# Patient Record
Sex: FEMALE | Race: ASIAN | NOT HISPANIC OR LATINO | Employment: FULL TIME | URBAN - METROPOLITAN AREA
[De-identification: names, ages, dates, MRNs, and addresses within clinical notes are randomized per-mention and may not be internally consistent; named-entity substitution may affect disease eponyms.]

---

## 2017-07-21 ENCOUNTER — TELEPHONE (OUTPATIENT)
Dept: INTERNAL MEDICINE | Facility: CLINIC | Age: 37
End: 2017-07-21

## 2017-07-21 NOTE — TELEPHONE ENCOUNTER
----- Message from Daysi Roger sent at 7/21/2017 10:33 AM CDT -----  Contact: self/[phone]  Patient called in regards needing to setup a lab orders for her upcoming appointment on 09/22/17. Please call and advise.       Thank you!!!

## 2017-07-21 NOTE — TELEPHONE ENCOUNTER
Patient appt booked incorrectly, conacted staff to reschedule correctly,     Pt requesting labs prior to appt, please advise

## 2017-07-21 NOTE — TELEPHONE ENCOUNTER
----- Message from Daysi Roger sent at 7/21/2017 10:33 AM CDT -----  Contact: self/[phone]  Patient called in regards needing to setup a lab orders for her upcoming appointment on 09/22/17. Please call and advise.       Thank you!!!

## 2017-07-21 NOTE — TELEPHONE ENCOUNTER
"Will order labs but definitely needs to be scheduled in an establish care appt (40 min) slot. Pt is currently in an "established patient" follow up appointment slot (20 min) and pt is new to [facility]. Needs to be in a "new patient" annual/establish care slot.    As she's new to [facility], I'll do fasting labs on the day of the appt. Please let pt know. Thanks!    Will cc staff, Jody and Karmen.  "

## 2017-07-21 NOTE — TELEPHONE ENCOUNTER
Please call patient to reschedule appt, pt is scheduled incorrectly please schedule as a New Patient physical not established patient

## 2017-09-27 ENCOUNTER — OFFICE VISIT (OUTPATIENT)
Dept: INTERNAL MEDICINE | Facility: CLINIC | Age: 37
End: 2017-09-27
Payer: COMMERCIAL

## 2017-09-27 VITALS
HEART RATE: 74 BPM | RESPIRATION RATE: 16 BRPM | DIASTOLIC BLOOD PRESSURE: 58 MMHG | HEIGHT: 64 IN | BODY MASS INDEX: 22.74 KG/M2 | WEIGHT: 133.19 LBS | SYSTOLIC BLOOD PRESSURE: 120 MMHG

## 2017-09-27 DIAGNOSIS — R10.9 RIGHT FLANK PAIN: ICD-10-CM

## 2017-09-27 DIAGNOSIS — M54.50 CHRONIC MIDLINE LOW BACK PAIN WITHOUT SCIATICA: ICD-10-CM

## 2017-09-27 DIAGNOSIS — Z12.4 CERVICAL CANCER SCREENING: ICD-10-CM

## 2017-09-27 DIAGNOSIS — Z00.00 ANNUAL PHYSICAL EXAM: Primary | ICD-10-CM

## 2017-09-27 DIAGNOSIS — G89.29 CHRONIC MIDLINE LOW BACK PAIN WITHOUT SCIATICA: ICD-10-CM

## 2017-09-27 LAB
BACTERIA #/AREA URNS AUTO: NORMAL /HPF
BILIRUB UR QL STRIP: NEGATIVE
CLARITY UR REFRACT.AUTO: ABNORMAL
COLOR UR AUTO: YELLOW
GLUCOSE UR QL STRIP: NEGATIVE
HGB UR QL STRIP: NEGATIVE
KETONES UR QL STRIP: NEGATIVE
LEUKOCYTE ESTERASE UR QL STRIP: ABNORMAL
MICROSCOPIC COMMENT: NORMAL
NITRITE UR QL STRIP: NEGATIVE
PH UR STRIP: 6 [PH] (ref 5–8)
PROT UR QL STRIP: NEGATIVE
RBC #/AREA URNS AUTO: 1 /HPF (ref 0–4)
SP GR UR STRIP: 1.02 (ref 1–1.03)
SQUAMOUS #/AREA URNS AUTO: 3 /HPF
URN SPEC COLLECT METH UR: ABNORMAL
UROBILINOGEN UR STRIP-ACNC: NEGATIVE EU/DL
WBC #/AREA URNS AUTO: 2 /HPF (ref 0–5)

## 2017-09-27 PROCEDURE — 99999 PR PBB SHADOW E&M-EST. PATIENT-LVL IV: CPT | Mod: PBBFAC,,, | Performed by: INTERNAL MEDICINE

## 2017-09-27 PROCEDURE — 81001 URINALYSIS AUTO W/SCOPE: CPT

## 2017-09-27 PROCEDURE — 99385 PREV VISIT NEW AGE 18-39: CPT | Mod: S$GLB,,, | Performed by: INTERNAL MEDICINE

## 2017-09-27 NOTE — PROGRESS NOTES
"INTERNAL MEDICINE INITIAL VISIT NOTE      CHIEF COMPLAINT     Chief Complaint   Patient presents with    [facility]     has occassional right side flank pain     HPI     Compa Beard is a 37 y.o. [place] female who presents to [facility].    C/o R flank pain. Intermittent. Fatty foods make it worse. Daily. No fevers/chills. No diarrhea/constipation. No nausea/vomiting.   Has happened before a few years ago (this pain feels just like a few yrs ago) - told she might be having cholecystitis - had US done in [place]. Took a medicine and improved.     C/o occasionally w/ lower back pain. No pain on urination. No hematuria.     Past Medical History:  History reviewed. No pertinent past medical history.    Past Surgical History:  History reviewed. No pertinent surgical history.    Allergies:  Review of patient's allergies indicates:   Allergen Reactions    Fish containing products Swelling     Facial swelling (lips, eyes, all areas)       Home Medications:  Prior to Admission medications    Not on File       Family History:  Family History   Problem Relation Age of Onset    No Known Problems Mother     Heart disease Father 55     CAD s/p stent    No Known Problems Sister     No Known Problems Brother        Social History:  Social History   Substance Use Topics    Smoking status: Never Smoker    Smokeless tobacco: Never Used    Alcohol use No     Review of Systems:  Review of Systems Comprehensive review of systems otherwise negative. See history/subjective section for more details.    Health Maintainence:   Td - declines  Flu - declines.  Pap - about 3-4 yrs ago.     PHYSICAL EXAM     BP (!) 120/58   Pulse 74   Resp 16   Ht 5' 4" (1.626 m)   Wt 60.4 kg (133 lb 2.5 oz)   LMP 09/10/2017   BMI 22.86 kg/m²     GEN - A+OX4, NAD   HEENT - PERRL, EOMI, OP clear. MMM.   Neck - No thyromegaly or cervical LAD. No thyroid masses felt.  CV - RRR, no m/r   Chest - CTAB, no wheezing or rhonchi  Abd - S/NT/ND/+BS.  " Negative Carson's sign.  No rebound or guarding.  Ext - 2+BDP and radial pulses. No LE edema.   Neuro - PERRL, EOMI, no nystagmus, eyebrow raise, facial sensation, hearing, m of mastication, smile, palatal raise, shoulder shrug, tongue protrusion symmetric and intact. 5/5 BUE and BLE strength. Sensation to light touch intact throughout. 2+ DTRs. Normal gait.   MSK - No spinal tenderness to palpation. Normal gait.   Skin - No rash.    LABS     Previous labs reviewed.    ASSESSMENT/PLAN     Compa Beard is a 37 y.o. female with  Compa was seen today for establish care.    Diagnoses and all orders for this visit:    Annual physical exam  -     CBC auto differential; Future; Expected date: 09/27/2017  -     Comprehensive metabolic panel; Future; Expected date: 09/27/2017  -     Hemoglobin A1c; Future; Expected date: 09/27/2017  -     Lipid panel; Future; Expected date: 09/27/2017  -     TSH; Future; Expected date: 09/27/2017  -     Vitamin D; Future; Expected date: 09/27/2017    Chronic midline low back pain without sciatica  -     Urinalysis Microscopic  -     Urinalysis    Right flank pain  -     US Abdomen Complete; Future; Expected date: 09/27/2017    Cervical cancer screening  -     Ambulatory Referral to Obstetrics / Gynecology      RTC in 12 months, sooner if needed and depending on labs.    Dagmar Betancur MD  Department of Internal Medicine - [facility]  9:07 AM

## 2017-09-29 ENCOUNTER — HOSPITAL ENCOUNTER (OUTPATIENT)
Dept: RADIOLOGY | Facility: HOSPITAL | Age: 37
Discharge: HOME | End: 2017-09-29
Attending: INTERNAL MEDICINE
Payer: COMMERCIAL

## 2017-09-29 DIAGNOSIS — E55.9 VITAMIN D DEFICIENCY: ICD-10-CM

## 2017-09-29 DIAGNOSIS — R10.9 RIGHT FLANK PAIN: ICD-10-CM

## 2017-09-29 PROCEDURE — 76700 US EXAM ABDOM COMPLETE: CPT | Mod: 26,,, | Performed by: RADIOLOGY

## 2017-09-29 PROCEDURE — 76700 US EXAM ABDOM COMPLETE: CPT | Mod: TC

## 2017-10-18 ENCOUNTER — OFFICE VISIT (OUTPATIENT)
Dept: OBSTETRICS AND GYNECOLOGY | Facility: CLINIC | Age: 37
End: 2017-10-18
Payer: COMMERCIAL

## 2017-10-18 VITALS
BODY MASS INDEX: 22.65 KG/M2 | HEIGHT: 64 IN | WEIGHT: 132.69 LBS | DIASTOLIC BLOOD PRESSURE: 58 MMHG | SYSTOLIC BLOOD PRESSURE: 90 MMHG

## 2017-10-18 DIAGNOSIS — Z01.419 ENCOUNTER FOR GYNECOLOGICAL EXAMINATION WITHOUT ABNORMAL FINDING: Primary | ICD-10-CM

## 2017-10-18 PROCEDURE — 99999 PR PBB SHADOW E&M-EST. PATIENT-LVL III: CPT | Mod: PBBFAC,,, | Performed by: OBSTETRICS & GYNECOLOGY

## 2017-10-18 PROCEDURE — 99385 PREV VISIT NEW AGE 18-39: CPT | Mod: S$GLB,,, | Performed by: OBSTETRICS & GYNECOLOGY

## 2017-10-18 NOTE — LETTER
October 18, 2017      Dagmar Betancur MD  [address]  [place] [address]           [facility]  [address]  [place] [address]  Phone: [phone]          Patient: Compa Beard   MR Number: [id number]   YOB: 1980   Date of Visit: 10/18/2017       Dear Dr. Dagmar Betancur:    Thank you for referring Compa Beard to me for evaluation. Attached you will find relevant portions of my assessment and plan of care.    If you have questions, please do not hesitate to call me. I look forward to following Compa Beard along with you.    Sincerely,    Lindsey Rosa MD    Enclosure  CC:  No Recipients    If you would like to receive this communication electronically, please contact [email or web address] or [phone] to request more information on [employer] Link access.    For providers and/or their staff who would like to refer a patient to [facility], please contact us through our one-stop-shop provider referral line, [facility], at [phone].    If you feel you have received this communication in error or would no longer like to receive these types of communications, please e-mail [email or web address]

## 2017-10-18 NOTE — PROGRESS NOTES
"  Subjective:       Patient ID: Compa Beard is a 37 y.o. female.    Chief Complaint:  Annual Exam      History of Present Illness  HPI    Compa Beard is a 37 y.o. female  NEW TO ME here for her annual GYN exam.  She was scheduled by her PCP, but she thought she was getting evaluated for possible Gallbladder disease.  She describes her periods as regular, normal flow, lasting 4 days.   Denies break through bleeding.   Denies vaginal itching or irritation.  Denies vaginal discharge.  She is sexually active. She has had 1 partner for 14 years .  She uses condoms for contraception.   History of abnormal pap: No  Last Pap: patient does not recall when last pap was  Denies domestic violence. She does feel safe at home.     Past Medical History:   Diagnosis Date    Vitamin D deficiency 2017     No past surgical history on file.  Social History     Social History    Marital status:      Spouse name: N/A    Number of children: N/A    Years of education: N/A     Occupational History    sales      Social History Main Topics    Smoking status: Never Smoker    Smokeless tobacco: Never Used    Alcohol use No    Drug use: No    Sexual activity: Yes     Partners: Male     Birth control/ protection: None, Condom      Comment:  since      Other Topics Concern    Not on file     Social History Narrative    Lived in [place] for 7 yrs now.      Family History   Problem Relation Age of Onset    No Known Problems Mother     Heart disease Father 55     CAD s/p stent    No Known Problems Sister     No Known Problems Brother     Breast cancer Neg Hx     Colon cancer Neg Hx     Ovarian cancer Neg Hx      OB History      Para Term  AB Living    1 1 1     1    SAB TAB Ectopic Multiple Live Births            1          BP (!) 90/58   Ht 5' 4" (1.626 m)   Wt 60.2 kg (132 lb 11.5 oz)   LMP 10/10/2017 (Exact Date)   BMI 22.78 kg/m²         GYN & OB History  Patient's last menstrual " period was 10/10/2017 (exact date).   Date of Last Pap: No result found    OB History    Para Term  AB Living   1 1 1     1   SAB TAB Ectopic Multiple Live Births           1      # Outcome Date GA Lbr Darryl/2nd Weight Sex Delivery Anes PTL Lv   1 Term     F    ALVAREZ          Review of Systems  Review of Systems   Constitutional: Negative for activity change, appetite change, fatigue and unexpected weight change.   HENT: Negative.    Eyes: Negative for visual disturbance.   Respiratory: Negative for shortness of breath and wheezing.    Cardiovascular: Negative for chest pain, palpitations and leg swelling.   Gastrointestinal: Negative for abdominal pain, bloating and blood in stool.   Endocrine: Negative for diabetes and hair loss.   Genitourinary: Negative for decreased libido and dyspareunia.   Musculoskeletal: Negative for back pain and joint swelling.   Skin:  Negative for no acne and hair changes.   Neurological: Negative for headaches.   Hematological: Does not bruise/bleed easily.   Psychiatric/Behavioral: Negative for depression and sleep disturbance. The patient is not nervous/anxious.    Breast: Negative for breast pain and nipple discharge          Objective:    Physical Exam:   Constitutional: She is oriented to person, place, and time. She appears well-developed and well-nourished.    HENT:   Head: Normocephalic and atraumatic.    Eyes: EOM are normal. Pupils are equal, round, and reactive to light.    Neck: Normal range of motion. Neck supple.    Cardiovascular: Normal rate and regular rhythm.     Pulmonary/Chest: Effort normal and breath sounds normal.        Abdominal: Soft. Bowel sounds are normal.             Musculoskeletal: Normal range of motion and moves all extremeties.       Neurological: She is alert and oriented to person, place, and time.    Skin: Skin is warm and dry.    Psychiatric: She has a normal mood and affect.          The patient declined to undress for a Breast and  Pelvic exam, stating she did not realize she would have to get them done, and as she is finishing up her menstrual cycle today, She wants to come back another day for the pelvic exam and pap.    Assessment:        1. Encounter for gynecological examination without abnormal finding               Plan:        1. Encounter for gynecological examination without abnormal finding  COUNSELING:  The patient was counseled today on osteoporosis prevention, calcium supplementation, and regular weight bearing exercise. The patient was also counseled today on ACS PAP guidelines, with recommendations for yearly pelvic exams unless their uterus, cervix, and ovaries were removed for benign reasons; in that case, examinations every 3-5 years are recommended. The patient was also counseled regarding monthly breast self-examination, routine STD screening for at-risk populations, prophylactic immunizations for transmitted infections such as HPV, Pertussis, or Influenza as appropriate, and yearly mammograms when indicated by ACS guidelines. She was advised to see her primary care physician for all other health maintenance.   FOLLOW-UP with me for next routine visit.            Return if symptoms worsen or fail to improve.

## 2019-02-20 ENCOUNTER — IMMUNIZATION (OUTPATIENT)
Dept: PHARMACY | Facility: CLINIC | Age: 39
End: 2019-02-20
Payer: COMMERCIAL

## 2019-02-20 ENCOUNTER — HOSPITAL ENCOUNTER (OUTPATIENT)
Dept: RADIOLOGY | Facility: HOSPITAL | Age: 39
Discharge: HOME | End: 2019-02-20
Attending: INTERNAL MEDICINE
Payer: COMMERCIAL

## 2019-02-20 ENCOUNTER — OFFICE VISIT (OUTPATIENT)
Dept: INTERNAL MEDICINE | Facility: CLINIC | Age: 39
End: 2019-02-20
Payer: COMMERCIAL

## 2019-02-20 ENCOUNTER — IMMUNIZATION (OUTPATIENT)
Dept: PHARMACY | Facility: CLINIC | Age: 39
End: 2019-02-20

## 2019-02-20 VITALS
WEIGHT: 130.06 LBS | HEIGHT: 64 IN | HEART RATE: 71 BPM | SYSTOLIC BLOOD PRESSURE: 117 MMHG | BODY MASS INDEX: 22.2 KG/M2 | OXYGEN SATURATION: 97 % | DIASTOLIC BLOOD PRESSURE: 74 MMHG

## 2019-02-20 DIAGNOSIS — Z82.49 FAMILY HISTORY OF HEART DISEASE IN MALE FAMILY MEMBER BEFORE AGE 55: ICD-10-CM

## 2019-02-20 DIAGNOSIS — E55.9 VITAMIN D DEFICIENCY: ICD-10-CM

## 2019-02-20 DIAGNOSIS — R07.89 ATYPICAL CHEST PAIN: ICD-10-CM

## 2019-02-20 DIAGNOSIS — Z00.00 ANNUAL PHYSICAL EXAM: Primary | ICD-10-CM

## 2019-02-20 PROCEDURE — 71046 X-RAY EXAM CHEST 2 VIEWS: CPT | Mod: TC

## 2019-02-20 PROCEDURE — 3008F PR BODY MASS INDEX (BMI) DOCUMENTED: ICD-10-PCS | Mod: CPTII,S$GLB,, | Performed by: INTERNAL MEDICINE

## 2019-02-20 PROCEDURE — 3008F BODY MASS INDEX DOCD: CPT | Mod: CPTII,S$GLB,, | Performed by: INTERNAL MEDICINE

## 2019-02-20 PROCEDURE — 99214 PR OFFICE/OUTPT VISIT, EST, LEVL IV, 30-39 MIN: ICD-10-PCS | Mod: S$GLB,,, | Performed by: INTERNAL MEDICINE

## 2019-02-20 PROCEDURE — 99999 PR PBB SHADOW E&M-EST. PATIENT-LVL IV: CPT | Mod: PBBFAC,,, | Performed by: INTERNAL MEDICINE

## 2019-02-20 PROCEDURE — 99999 PR PBB SHADOW E&M-EST. PATIENT-LVL IV: ICD-10-PCS | Mod: PBBFAC,,, | Performed by: INTERNAL MEDICINE

## 2019-02-20 PROCEDURE — 71046 X-RAY EXAM CHEST 2 VIEWS: CPT | Mod: 26,,, | Performed by: RADIOLOGY

## 2019-02-20 PROCEDURE — 71046 XR CHEST PA AND LATERAL: ICD-10-PCS | Mod: 26,,, | Performed by: RADIOLOGY

## 2019-02-20 PROCEDURE — 99214 OFFICE O/P EST MOD 30 MIN: CPT | Mod: S$GLB,,, | Performed by: INTERNAL MEDICINE

## 2019-02-20 RX ORDER — ASPIRIN 325 MG
50000 TABLET, DELAYED RELEASE (ENTERIC COATED) ORAL WEEKLY
Qty: 12 CAPSULE | Refills: 4 | Status: SHIPPED | OUTPATIENT
Start: 2019-02-20 | End: 2019-07-10 | Stop reason: SDUPTHER

## 2019-02-20 NOTE — PROGRESS NOTES
INTERNAL MEDICINE CLINIC - SAME DAY APPOINTMENT  Progress Note    PRESENTING HISTORY     PCP: Dagmar Betancur MD  Chief Complaint/Reason for Visit:     Chief Complaint   Patient presents with    Follow-up     History of Present Illness & ROS : Ms. Compa Beard is a 38 y.o. female.      Here for annual physical.    Sometimes she feels pain in the left chest lasting only 1 sec.  Chest pain sometimes.  No SOB.    Had echo normal in [place] - without issues.    No heartburn.    PAST HISTORY:     Past Medical History:   Diagnosis Date    Vitamin D deficiency 9/29/2017     History reviewed. No pertinent surgical history.    Family History   Problem Relation Age of Onset    No Known Problems Mother     Heart disease Father 55        CAD s/p stent    No Known Problems Sister     No Known Problems Brother     Breast cancer Neg Hx     Colon cancer Neg Hx     Ovarian cancer Neg Hx        Social History     Socioeconomic History    Marital status:      Spouse name: None    Number of children: 1    Years of education: None    Highest education level: None   Social Needs    Financial resource strain: None    Food insecurity - worry: None    Food insecurity - inability: None    Transportation needs - medical: None    Transportation needs - non-medical: None   Occupational History    Occupation: sales   Tobacco Use    Smoking status: Never Smoker    Smokeless tobacco: Never Used   Substance and Sexual Activity    Alcohol use: No    Drug use: No    Sexual activity: Yes     Partners: Male     Birth control/protection: None, Condom     Comment:  since 2003   Other Topics Concern    None   Social History Narrative    Lived in [place] for 7 yrs now.        MEDICATIONS & ALLERGIES:     No current outpatient medications on file prior to visit.     No current facility-administered medications on file prior to visit.         Review of patient's allergies indicates:   Allergen Reactions    Fish containing  products Swelling     Facial swelling (lips, eyes, all areas)       Medications Reconciliation:   I have reconciled the patient's home medications with the patient/family. I have updated all changes.  Refer to After-Visit Medication List.    OBJECTIVE:     Vital Signs:  Vitals:    02/20/19 0937   BP: 117/74   Pulse: 71     Wt Readings from Last 1 Encounters:   02/20/19 0937 59 kg (130 lb 1.1 oz)     Body mass index is 22.33 kg/m².     Physical Exam:  General: Well developed, well nourished. No distress.  HEENT: Head is normocephalic, atraumatic; ears are normal.    Eyes: Clear conjunctiva.  Neck: Supple, symmetrical neck; trachea midline.  Lungs: Clear to auscultation bilaterally and normal respiratory effort.  Cardiovascular: Heart with regular rate and rhythm.    Extremities: No LE edema. Pulses 2+ and symmetric.   Abdomen: Abdomen is soft, non-tender non-distended with normal bowel sounds.  Skin: Skin color, texture, turgor normal. No rashes.  Musculoskeletal: Normal gait.   Lymph Nodes: No cervical or supraclavicular adenopathy.  Neurologic: Normal strength and tone. No focal numbness or weakness.   Psychiatric: Normal affect. Alert.    Laboratory  Lab Results   Component Value Date    WBC 6.99 09/29/2017    HGB 12.2 09/29/2017    HCT 37.2 09/29/2017     09/29/2017    CHOL 147 09/29/2017    TRIG 88 09/29/2017    HDL 47 09/29/2017    ALT 9 (L) 09/29/2017    AST 14 09/29/2017     09/29/2017    K 4.3 09/29/2017     09/29/2017    CREATININE 0.6 09/29/2017    BUN 7 09/29/2017    CO2 21 (L) 09/29/2017    TSH 1.563 09/29/2017    HGBA1C 5.3 09/29/2017       ASSESSMENT & PLAN:     Annual physical exam  - PCP is Dr. Betancur.  -     Ambulatory consult to Gynecology  -     Lipid panel; Future; Expected date: 02/20/2019  -     CBC auto differential; Future; Expected date: 02/20/2019  -     Comprehensive metabolic panel; Future; Expected date: 02/20/2019  -     Hemoglobin A1c; Future; Expected date:  02/20/2019  -     TSH; Future; Expected date: 02/20/2019    Vitamin D deficiency  -     cholecalciferol, vitamin D3, 50,000 unit capsule; Take 1 capsule (50,000 Units total) by mouth once a week.  Dispense: 12 capsule; Refill: 4    Family history of heart disease in male family member before age 55  Atypical chest pain  -     X-Ray Chest PA And Lateral; Future; Expected date: 02/20/2019  -     Echocardiogram stress test with CFD (Cupid Only); Future; Expected date: 03/20/2019      Scheduled Follow-up :  Future Appointments   Date Time Provider Department Center   2/22/2019  8:30 AM [facility]i [facility]       After Visit Medication List :     Medication List           Accurate as of 2/20/19 10:20 AM. If you have any questions, ask your nurse or doctor.               START taking these medications    cholecalciferol (vitamin D3) 50,000 unit capsule  Take 1 capsule (50,000 Units total) by mouth once a week.  Started by:  Guzman Salcedo MD           Where to Get Your Medications      These medications were sent to [facility] [address] [place] [address]  [address] [place] [id number]    Phone:  [phone]   · cholecalciferol (vitamin D3) 50,000 unit capsule         Signing Physician:  Guzman Salcedo MD

## 2019-02-22 ENCOUNTER — LAB VISIT (OUTPATIENT)
Dept: LAB | Facility: HOSPITAL | Age: 39
End: 2019-02-22
Attending: INTERNAL MEDICINE
Payer: COMMERCIAL

## 2019-02-22 DIAGNOSIS — Z00.00 ANNUAL PHYSICAL EXAM: ICD-10-CM

## 2019-02-22 LAB
ALBUMIN SERPL BCP-MCNC: 3.8 G/DL
ALP SERPL-CCNC: 51 U/L
ALT SERPL W/O P-5'-P-CCNC: 11 U/L
ANION GAP SERPL CALC-SCNC: 7 MMOL/L
AST SERPL-CCNC: 13 U/L
BASOPHILS # BLD AUTO: 0.02 K/UL
BASOPHILS NFR BLD: 0.4 %
BILIRUB SERPL-MCNC: 0.5 MG/DL
BUN SERPL-MCNC: 13 MG/DL
CALCIUM SERPL-MCNC: 9.2 MG/DL
CHLORIDE SERPL-SCNC: 108 MMOL/L
CHOLEST SERPL-MCNC: 154 MG/DL
CHOLEST/HDLC SERPL: 3.1 {RATIO}
CO2 SERPL-SCNC: 24 MMOL/L
CREAT SERPL-MCNC: 0.6 MG/DL
DIFFERENTIAL METHOD: ABNORMAL
EOSINOPHIL # BLD AUTO: 0.2 K/UL
EOSINOPHIL NFR BLD: 4.2 %
ERYTHROCYTE [DISTWIDTH] IN BLOOD BY AUTOMATED COUNT: 12.7 %
EST. GFR  (AFRICAN AMERICAN): >60 ML/MIN/1.73 M^2
EST. GFR  (NON AFRICAN AMERICAN): >60 ML/MIN/1.73 M^2
ESTIMATED AVG GLUCOSE: 108 MG/DL
GLUCOSE SERPL-MCNC: 94 MG/DL
HBA1C MFR BLD HPLC: 5.4 %
HCT VFR BLD AUTO: 39 %
HDLC SERPL-MCNC: 49 MG/DL
HDLC SERPL: 31.8 %
HGB BLD-MCNC: 12.2 G/DL
LDLC SERPL CALC-MCNC: 87.8 MG/DL
LYMPHOCYTES # BLD AUTO: 1.7 K/UL
LYMPHOCYTES NFR BLD: 32.8 %
MCH RBC QN AUTO: 29.5 PG
MCHC RBC AUTO-ENTMCNC: 31.3 G/DL
MCV RBC AUTO: 94 FL
MONOCYTES # BLD AUTO: 0.5 K/UL
MONOCYTES NFR BLD: 9.1 %
NEUTROPHILS # BLD AUTO: 2.7 K/UL
NEUTROPHILS NFR BLD: 53.5 %
NONHDLC SERPL-MCNC: 105 MG/DL
PLATELET # BLD AUTO: 235 K/UL
PMV BLD AUTO: 10.7 FL
POTASSIUM SERPL-SCNC: 4.2 MMOL/L
PROT SERPL-MCNC: 7.2 G/DL
RBC # BLD AUTO: 4.13 M/UL
SODIUM SERPL-SCNC: 139 MMOL/L
TRIGL SERPL-MCNC: 86 MG/DL
TSH SERPL DL<=0.005 MIU/L-ACNC: 1.53 UIU/ML
WBC # BLD AUTO: 5.06 K/UL

## 2019-02-22 PROCEDURE — 80053 COMPREHEN METABOLIC PANEL: CPT

## 2019-02-22 PROCEDURE — 84443 ASSAY THYROID STIM HORMONE: CPT

## 2019-02-22 PROCEDURE — 36415 COLL VENOUS BLD VENIPUNCTURE: CPT

## 2019-02-22 PROCEDURE — 80061 LIPID PANEL: CPT

## 2019-02-22 PROCEDURE — 83036 HEMOGLOBIN GLYCOSYLATED A1C: CPT

## 2019-02-22 PROCEDURE — 85025 COMPLETE CBC W/AUTO DIFF WBC: CPT

## 2019-02-27 ENCOUNTER — HOSPITAL ENCOUNTER (OUTPATIENT)
Dept: CARDIOLOGY | Facility: CLINIC | Age: 39
Discharge: HOME | End: 2019-02-27
Attending: INTERNAL MEDICINE
Payer: COMMERCIAL

## 2019-02-27 VITALS — WEIGHT: 130 LBS | HEIGHT: 64 IN | BODY MASS INDEX: 22.2 KG/M2

## 2019-02-27 DIAGNOSIS — R07.89 ATYPICAL CHEST PAIN: ICD-10-CM

## 2019-02-27 LAB
ASCENDING AORTA: 2.35 CM
AV INDEX (PROSTH): 1.03
AV MEAN GRADIENT: 2.27 MMHG
AV PEAK GRADIENT: 4.16 MMHG
AV VALVE AREA: 3.19 CM2
AV VELOCITY RATIO: 0.99
BSA FOR ECHO PROCEDURE: 1.63 M2
CV ECHO LV RWT: 0.32 CM
CV STRESS BASE HR: 64 BPM
DIASTOLIC BLOOD PRESSURE: 59 MMHG
DOP CALC AO PEAK VEL: 1.02 M/S
DOP CALC AO VTI: 23 CM
DOP CALC LVOT AREA: 3.11 CM2
DOP CALC LVOT DIAMETER: 1.99 CM
DOP CALC LVOT PEAK VEL: 1.01 M/S
DOP CALC LVOT STROKE VOLUME: 73.3 CM3
DOP CALCLVOT PEAK VEL VTI: 23.58 CM
E WAVE DECELERATION TIME: 172.53 MSEC
E/A RATIO: 2.1
E/E' RATIO: 8.08
ECHO LV POSTERIOR WALL: 0.65 CM (ref 0.6–1.1)
FRACTIONAL SHORTENING: 33 % (ref 28–44)
INTERVENTRICULAR SEPTUM: 0.61 CM (ref 0.6–1.1)
IVRT: 0.08 MSEC
LA MAJOR: 4.86 CM
LA MINOR: 4.95 CM
LA WIDTH: 3.23 CM
LEFT ATRIUM SIZE: 2.62 CM
LEFT ATRIUM VOLUME INDEX: 21.7 ML/M2
LEFT ATRIUM VOLUME: 35.28 CM3
LEFT INTERNAL DIMENSION IN SYSTOLE: 2.74 CM (ref 2.1–4)
LEFT VENTRICLE DIASTOLIC VOLUME INDEX: 45.33 ML/M2
LEFT VENTRICLE DIASTOLIC VOLUME: 73.84 ML
LEFT VENTRICLE MASS INDEX: 43.6 G/M2
LEFT VENTRICLE SYSTOLIC VOLUME INDEX: 17.2 ML/M2
LEFT VENTRICLE SYSTOLIC VOLUME: 28.04 ML
LEFT VENTRICULAR INTERNAL DIMENSION IN DIASTOLE: 4.09 CM (ref 3.5–6)
LEFT VENTRICULAR MASS: 71.08 G
LV LATERAL E/E' RATIO: 7
LV SEPTAL E/E' RATIO: 9.55
MV PEAK A VEL: 0.5 M/S
MV PEAK E VEL: 1.05 M/S
OHS CV CPX 1 MINUTE RECOVERY HEART RATE: 136 BPM
OHS CV CPX 85 PERCENT MAX PREDICTED HEART RATE MALE: 146
OHS CV CPX ESTIMATED METS: 13
OHS CV CPX MAX PREDICTED HEART RATE: 172
OHS CV CPX PATIENT IS FEMALE: 1
OHS CV CPX PATIENT IS MALE: 0
OHS CV CPX PEAK DIASTOLIC BLOOD PRESSURE: 85 MMHG
OHS CV CPX PEAK HEAR RATE: 173 BPM
OHS CV CPX PEAK RATE PRESSURE PRODUCT: NORMAL
OHS CV CPX PEAK SYSTOLIC BLOOD PRESSURE: 143 MMHG
OHS CV CPX PERCENT MAX PREDICTED HEART RATE ACHIEVED: 101
OHS CV CPX PERCENT TARGET HEART RATE ACHIEVED: 118.49
OHS CV CPX RATE PRESSURE PRODUCT PRESENTING: 6656
OHS CV CPX TARGET HEART RATE: 146
PISA TR MAX VEL: 1.91 M/S
PULM VEIN S/D RATIO: 0.82
PV PEAK D VEL: 0.62 M/S
PV PEAK S VEL: 0.51 M/S
RA MAJOR: 4.43 CM
RA PRESSURE: 3 MMHG
RA WIDTH: 3.03 CM
RIGHT VENTRICULAR END-DIASTOLIC DIMENSION: 2.85 CM
RV TISSUE DOPPLER FREE WALL SYSTOLIC VELOCITY 1 (APICAL 4 CHAMBER VIEW): 11.37 M/S
SINUS: 3.11 CM
STJ: 2.48 CM
STRESS ECHO POST EXERCISE DUR MIN: 7 MIN
STRESS ECHO POST EXERCISE DUR SEC: 52
SYSTOLIC BLOOD PRESSURE: 104 MMHG
TDI LATERAL: 0.15
TDI SEPTAL: 0.11
TDI: 0.13
TR MAX PG: 14.59 MMHG
TRICUSPID ANNULAR PLANE SYSTOLIC EXCURSION: 2.3 CM
TV REST PULMONARY ARTERY PRESSURE: 18 MMHG

## 2019-02-27 PROCEDURE — 93351 ECHOCARDIOGRAM STRESS TEST WITH COLOR FLOW DOPPLER (CUPID ONLY): ICD-10-PCS | Mod: S$GLB,,, | Performed by: INTERNAL MEDICINE

## 2019-02-27 PROCEDURE — 93351 STRESS TTE COMPLETE: CPT | Mod: S$GLB,,, | Performed by: INTERNAL MEDICINE

## 2019-03-27 ENCOUNTER — OFFICE VISIT (OUTPATIENT)
Dept: OBSTETRICS AND GYNECOLOGY | Facility: CLINIC | Age: 39
End: 2019-03-27
Payer: COMMERCIAL

## 2019-03-27 VITALS
HEIGHT: 66 IN | SYSTOLIC BLOOD PRESSURE: 118 MMHG | BODY MASS INDEX: 21.21 KG/M2 | WEIGHT: 132 LBS | DIASTOLIC BLOOD PRESSURE: 60 MMHG

## 2019-03-27 DIAGNOSIS — Z01.419 ENCOUNTER FOR GYNECOLOGICAL EXAMINATION WITHOUT ABNORMAL FINDING: Primary | ICD-10-CM

## 2019-03-27 DIAGNOSIS — Z12.4 PAP SMEAR FOR CERVICAL CANCER SCREENING: ICD-10-CM

## 2019-03-27 DIAGNOSIS — Z12.31 SCREENING MAMMOGRAM, ENCOUNTER FOR: ICD-10-CM

## 2019-03-27 PROCEDURE — 87624 HPV HI-RISK TYP POOLED RSLT: CPT

## 2019-03-27 PROCEDURE — 99999 PR PBB SHADOW E&M-EST. PATIENT-LVL III: ICD-10-PCS | Mod: PBBFAC,,, | Performed by: OBSTETRICS & GYNECOLOGY

## 2019-03-27 PROCEDURE — 99395 PREV VISIT EST AGE 18-39: CPT | Mod: S$GLB,,, | Performed by: OBSTETRICS & GYNECOLOGY

## 2019-03-27 PROCEDURE — 88175 CYTOPATH C/V AUTO FLUID REDO: CPT

## 2019-03-27 PROCEDURE — 99999 PR PBB SHADOW E&M-EST. PATIENT-LVL III: CPT | Mod: PBBFAC,,, | Performed by: OBSTETRICS & GYNECOLOGY

## 2019-03-27 PROCEDURE — 99395 PR PREVENTIVE VISIT,EST,18-39: ICD-10-PCS | Mod: S$GLB,,, | Performed by: OBSTETRICS & GYNECOLOGY

## 2019-03-27 NOTE — LETTER
March 27, 2019      Guzman Salcedo MD  [address]  [place] [address]           [facility]  [address] [facility]  [facility] [address]  Phone: [phone]          Patient: Compa Beard   MR Number: [id number]   YOB: 1980   Date of Visit: 3/27/2019       Dear Dr. Guzman Salcedo:    Thank you for referring Compa Beard to me for evaluation. Attached you will find relevant portions of my assessment and plan of care.    If you have questions, please do not hesitate to call me. I look forward to following Compa Beard along with you.    Sincerely,    Lindsey Rosa MD    Enclosure  CC:  No Recipients    If you would like to receive this communication electronically, please contact [email or web address] or [phone] to request more information on [employer] access.    For providers and/or their staff who would like to refer a patient to [facility], please contact us through our one-stop-shop provider referral line, [facility] , at [phone].    If you feel you have received this communication in error or would no longer like to receive these types of communications, please e-mail [email or web address]

## 2019-03-27 NOTE — PROGRESS NOTES
Subjective:       Patient ID: Compa Beard is a 38 y.o. female.    Chief Complaint:  Well Woman      History of Present Illness  HPI    Compa Beard is a 38 y.o. female [id number] TO ME  here for her annual GYN exam.    She describes her periods as regular, normal flow, lasting 3-4 days.   denies break through bleeding.   denies vaginal itching or irritation.  Denies vaginal discharge.  She is sexually active. She has hd 1 partner for 16 years .  She uses condoms for contraception.   History of abnormal pap: No  Last Pap: patient does not recall when last pap was  Last MMG: None  Last Colonoscopy:  None  denies domestic violence. She does feel safe at home.     Past Medical History:   Diagnosis Date    Vitamin D deficiency 2017     History reviewed. No pertinent surgical history.  Social History     Socioeconomic History    Marital status:      Spouse name: Not on file    Number of children: 1    Years of education: Not on file    Highest education level: Not on file   Occupational History    Occupation: sales   Social Needs    Financial resource strain: Not on file    Food insecurity:     Worry: Not on file     Inability: Not on file    Transportation needs:     Medical: Not on file     Non-medical: Not on file   Tobacco Use    Smoking status: Never Smoker    Smokeless tobacco: Never Used   Substance and Sexual Activity    Alcohol use: No    Drug use: No    Sexual activity: Yes     Partners: Male     Birth control/protection: None, Condom     Comment:  since    Lifestyle    Physical activity:     Days per week: Not on file     Minutes per session: Not on file    Stress: Not on file   Relationships    Social connections:     Talks on phone: Not on file     Gets together: Not on file     Attends [employer] service: Not on file     Active member of club or organization: Not on file     Attends meetings of clubs or organizations: Not on file     Relationship status: Not on file     "Intimate partner violence:     Fear of current or ex partner: Not on file     Emotionally abused: Not on file     Physically abused: Not on file     Forced sexual activity: Not on file   Other Topics Concern    Not on file   Social History Narrative    Lived in [place] for 7 yrs now.      Family History   Problem Relation Age of Onset    No Known Problems Mother     Heart disease Father 55        CAD s/p stent    No Known Problems Sister     No Known Problems Brother     Breast cancer Neg Hx     Colon cancer Neg Hx     Ovarian cancer Neg Hx     Diabetes Neg Hx     Hypertension Neg Hx      OB History        1    Para   1    Term   1            AB        Living   1       SAB        TAB        Ectopic        Multiple        Live Births   1                 /60   Ht 5' 6" (1.676 m)   Wt 59.9 kg (132 lb)   LMP 2019   BMI 21.31 kg/m²         GYN & OB History  Patient's last menstrual period was 2019.   Date of Last Pap: No result found    OB History    Para Term  AB Living   1 1 1     1   SAB TAB Ectopic Multiple Live Births           1      # Outcome Date GA Lbr Darryl/2nd Weight Sex Delivery Anes PTL Lv   1 Term 04    F Vag-Spont   ALVAREZ       Review of Systems  Review of Systems   Constitutional: Negative for activity change, appetite change, fatigue and unexpected weight change.   HENT: Negative.    Eyes: Negative for visual disturbance.   Respiratory: Negative for shortness of breath and wheezing.    Cardiovascular: Negative for chest pain, palpitations and leg swelling.   Gastrointestinal: Negative for abdominal pain, bloating and blood in stool.   Endocrine: Negative for diabetes and hair loss.   Genitourinary: Negative for decreased libido, dyspareunia, menorrhagia and menstrual problem.   Musculoskeletal: Negative for back pain and joint swelling.   Integumentary:  Negative for acne, hair changes and nipple discharge.   Neurological: Negative for " headaches.   Hematological: Does not bruise/bleed easily.   Psychiatric/Behavioral: Negative for depression and sleep disturbance. The patient is not nervous/anxious.    Breast: Negative for mastodynia and nipple discharge          Objective:      Physical Exam:   Constitutional: She is oriented to person, place, and time. She appears well-developed and well-nourished.    HENT:   Head: Normocephalic and atraumatic.    Eyes: Pupils are equal, round, and reactive to light. EOM are normal.    Neck: Normal range of motion. Neck supple.    Cardiovascular: Normal rate and regular rhythm.     Pulmonary/Chest: Effort normal and breath sounds normal.   BREASTS:  no mass, no tenderness, no deformity and no retraction. Right breast exhibits no inverted nipple, no mass, no nipple discharge, no skin change, no tenderness, no bleeding and no swelling. Left breast exhibits no inverted nipple, no mass, no nipple discharge, no skin change, no tenderness, no bleeding and no swelling. Breasts are symmetrical.              Abdominal: Soft. Bowel sounds are normal.     Genitourinary: Pelvic exam was performed with patient supine.   Genitourinary Comments: PELVIC: Normal external genitalia without lesions.  Normal hair distribution.  Adequate perineal body, normal urethral meatus.  Vagina moist and well rugated without lesions or discharge.  Cervix pink, without lesions, discharge or tenderness.  No significant cystocele or rectocele.  Bimanual exam shows uterus to be normal size, regular, mobile and nontender.  Adnexa without masses or tenderness.               Musculoskeletal: Normal range of motion and moves all extremeties.       Neurological: She is alert and oriented to person, place, and time.    Skin: Skin is warm and dry.    Psychiatric: She has a normal mood and affect.              Assessment:        1. Encounter for gynecological examination without abnormal finding    2. Pap smear for cervical cancer screening    3.  Screening mammogram, encounter for                Plan:        1. Encounter for gynecological examination without abnormal finding  COUNSELING:  The patient was counseled today on osteoporosis prevention, calcium supplementation, and regular weight bearing exercise. The patient was also counseled today on ACS PAP guidelines, with recommendations for yearly pelvic exams unless their uterus, cervix, and ovaries were removed for benign reasons; in that case, examinations every 3-5 years are recommended. The patient was also counseled regarding monthly breast self-examination, routine STD screening for at-risk populations, prophylactic immunizations for transmitted infections such as HPV, Pertussis, or Influenza as appropriate, and yearly mammograms when indicated by ACS guidelines. She was advised to see her primary care physician for all other health maintenance.   FOLLOW-UP with me for next routine visit.         2. Pap smear for cervical cancer screening      - Liquid-based pap smear, screening  - HPV High Risk Genotypes, PCR    3. Screening mammogram, encounter for      - Mammo Digital Screening Bilat w/ Raulito; Future       Follow up in about 1 year (around 3/27/2020).

## 2019-04-02 LAB
HPV HR 12 DNA CVX QL NAA+PROBE: NEGATIVE
HPV16 AG SPEC QL: NEGATIVE
HPV18 DNA SPEC QL NAA+PROBE: NEGATIVE

## 2019-07-03 ENCOUNTER — APPOINTMENT (OUTPATIENT)
Dept: RADIOLOGY | Facility: OTHER | Age: 39
End: 2019-07-03
Attending: OBSTETRICS & GYNECOLOGY
Payer: COMMERCIAL

## 2019-07-03 VITALS — WEIGHT: 132 LBS | HEIGHT: 66 IN | BODY MASS INDEX: 21.21 KG/M2

## 2019-07-03 DIAGNOSIS — Z12.31 SCREENING MAMMOGRAM, ENCOUNTER FOR: ICD-10-CM

## 2019-07-03 PROCEDURE — 77067 SCR MAMMO BI INCL CAD: CPT | Mod: 26,,, | Performed by: RADIOLOGY

## 2019-07-03 PROCEDURE — 77067 MAMMO DIGITAL SCREENING BILAT WITH TOMOSYNTHESIS_CAD: ICD-10-PCS | Mod: 26,,, | Performed by: RADIOLOGY

## 2019-07-03 PROCEDURE — 77067 SCR MAMMO BI INCL CAD: CPT | Mod: TC,PN

## 2019-07-03 PROCEDURE — 77063 MAMMO DIGITAL SCREENING BILAT WITH TOMOSYNTHESIS_CAD: ICD-10-PCS | Mod: 26,,, | Performed by: RADIOLOGY

## 2019-07-03 PROCEDURE — 77063 BREAST TOMOSYNTHESIS BI: CPT | Mod: 26,,, | Performed by: RADIOLOGY

## 2019-07-10 ENCOUNTER — OFFICE VISIT (OUTPATIENT)
Dept: INTERNAL MEDICINE | Facility: CLINIC | Age: 39
End: 2019-07-10
Payer: COMMERCIAL

## 2019-07-10 VITALS
BODY MASS INDEX: 22.44 KG/M2 | HEART RATE: 65 BPM | OXYGEN SATURATION: 98 % | HEIGHT: 65 IN | DIASTOLIC BLOOD PRESSURE: 64 MMHG | WEIGHT: 134.69 LBS | SYSTOLIC BLOOD PRESSURE: 100 MMHG

## 2019-07-10 DIAGNOSIS — L56.8 PHOTOSENSITIVITY OF SKIN: Primary | ICD-10-CM

## 2019-07-10 DIAGNOSIS — L20.81 ATOPIC NEURODERMATITIS: ICD-10-CM

## 2019-07-10 DIAGNOSIS — E55.9 VITAMIN D DEFICIENCY: ICD-10-CM

## 2019-07-10 PROBLEM — R07.89 ATYPICAL CHEST PAIN: Status: RESOLVED | Noted: 2019-02-20 | Resolved: 2019-07-10

## 2019-07-10 PROCEDURE — 99214 OFFICE O/P EST MOD 30 MIN: CPT | Mod: S$GLB,,, | Performed by: INTERNAL MEDICINE

## 2019-07-10 PROCEDURE — 3008F PR BODY MASS INDEX (BMI) DOCUMENTED: ICD-10-PCS | Mod: CPTII,S$GLB,, | Performed by: INTERNAL MEDICINE

## 2019-07-10 PROCEDURE — 99999 PR PBB SHADOW E&M-EST. PATIENT-LVL III: CPT | Mod: PBBFAC,,, | Performed by: INTERNAL MEDICINE

## 2019-07-10 PROCEDURE — 3008F BODY MASS INDEX DOCD: CPT | Mod: CPTII,S$GLB,, | Performed by: INTERNAL MEDICINE

## 2019-07-10 PROCEDURE — 99214 PR OFFICE/OUTPT VISIT, EST, LEVL IV, 30-39 MIN: ICD-10-PCS | Mod: S$GLB,,, | Performed by: INTERNAL MEDICINE

## 2019-07-10 PROCEDURE — 99999 PR PBB SHADOW E&M-EST. PATIENT-LVL III: ICD-10-PCS | Mod: PBBFAC,,, | Performed by: INTERNAL MEDICINE

## 2019-07-10 RX ORDER — ASPIRIN 325 MG
50000 TABLET, DELAYED RELEASE (ENTERIC COATED) ORAL
Qty: 24 CAPSULE | Refills: 4 | Status: SHIPPED | OUTPATIENT
Start: 2019-07-11 | End: 2020-03-13 | Stop reason: SDUPTHER

## 2019-07-10 RX ORDER — TRIAMCINOLONE ACETONIDE 5 MG/G
CREAM TOPICAL 2 TIMES DAILY PRN
Qty: 15 G | Refills: 6 | Status: SHIPPED | OUTPATIENT
Start: 2019-07-10 | End: 2020-03-13 | Stop reason: SDUPTHER

## 2019-07-10 NOTE — PROGRESS NOTES
INTERNAL MEDICINE CLINIC - SAME DAY APPOINTMENT  Progress Note    PRESENTING HISTORY     PCP: Dagmar Betancur MD  Chief Complaint/Reason for Visit:     Chief Complaint   Patient presents with    Follow-up     lab results     History of Present Illness & ROS : Ms. Compa Beard is a 39 y.o. female.      She complains of itching to neck area post sunlight.  Also rash to right antecubital area.      PAST HISTORY:     Past Medical History:   Diagnosis Date    Vitamin D deficiency 9/29/2017       No past surgical history on file.    Family History   Problem Relation Age of Onset    No Known Problems Mother     Heart disease Father 55        CAD s/p stent    No Known Problems Sister     No Known Problems Brother     Breast cancer Neg Hx     Colon cancer Neg Hx     Ovarian cancer Neg Hx     Diabetes Neg Hx     Hypertension Neg Hx        Social History     Socioeconomic History    Marital status:      Spouse name: Not on file    Number of children: 1    Years of education: Not on file    Highest education level: Not on file   Occupational History    Occupation: sales   Social Needs    Financial resource strain: Not on file    Food insecurity:     Worry: Not on file     Inability: Not on file    Transportation needs:     Medical: Not on file     Non-medical: Not on file   Tobacco Use    Smoking status: Never Smoker    Smokeless tobacco: Never Used   Substance and Sexual Activity    Alcohol use: No    Drug use: No    Sexual activity: Yes     Partners: Male     Birth control/protection: None, Condom     Comment:  since 2003   Lifestyle    Physical activity:     Days per week: Not on file     Minutes per session: Not on file    Stress: Not on file   Relationships    Social connections:     Talks on phone: Not on file     Gets together: Not on file     Attends [employer] service: Not on file     Active member of club or organization: Not on file     Attends meetings of clubs or organizations: Not on  file     Relationship status: Not on file   Other Topics Concern    Not on file   Social History Narrative    Lived in [place] for 7 yrs now.        MEDICATIONS & ALLERGIES:     Current Outpatient Medications on File Prior to Visit   Medication Sig Dispense Refill     cholecalciferol, vitamin D3, 50,000 unit capsule Take 1 capsule (50,000 Units total) by mouth once a week. 12 capsule 4     No current facility-administered medications on file prior to visit.         Review of patient's allergies indicates:   Allergen Reactions    Fish containing products Swelling     Facial swelling (lips, eyes, all areas)       Medications Reconciliation:   I have reconciled the patient's home medications with the patient/family. I have updated all changes.  Refer to After-Visit Medication List.    OBJECTIVE:     Vital Signs:  Vitals:    07/10/19 1520   BP: 100/64   Pulse: 65     Wt Readings from Last 1 Encounters:   07/10/19 1520 61.1 kg (134 lb 11.2 oz)     Body mass index is 22.42 kg/m².     Physical Exam:  General: Well developed, well nourished.  HEENT: Head is normocephalic, atraumatic   Eyes: Clear conjunctiva.  Neck: Supple, symmetrical neck; trachea midline. No rash found.  Lungs:  normal respiratory effort.  Cardiovascular: Heart with regular rate and rhythm.    Extremities: No LE edema.    Skin: slight rash on right antecubital.  Neck without rash.   Musculoskeletal: Normal gait.   Psychiatric: Normal affect. Alert.    Laboratory  Lab Results   Component Value Date    WBC 5.06 02/22/2019    HGB 12.2 02/22/2019    HCT 39.0 02/22/2019     02/22/2019    CHOL 154 02/22/2019    TRIG 86 02/22/2019    HDL 49 02/22/2019    ALT 11 02/22/2019    AST 13 02/22/2019     02/22/2019    K 4.2 02/22/2019     02/22/2019    CREATININE 0.6 02/22/2019    BUN 13 02/22/2019    CO2 24 02/22/2019    TSH 1.535 02/22/2019    HGBA1C 5.4 02/22/2019       ASSESSMENT & PLAN:     Photosensitivity of skin  Atopic  neurodermatitis  -     triamcinolone acetonide 0.5% (KENALOG) 0.5 % Crea; Apply topically 2 (two) times daily as needed.  Dispense: 15 g; Refill: 6    Vitamin D deficiency  - Discussed importance of taking Vitamin D due to long term.     RTC to check in 6 month.  -     cholecalciferol, vitamin D3, 50,000 unit capsule; Take 1 capsule (50,000 Units total) by mouth every Monday and Thursday.  Dispense: 24 capsule; Refill: 4      After Visit Medication List :     Medication List           Accurate as of 7/10/19  3:45 PM. If you have any questions, ask your nurse or doctor.               START taking these medications    triamcinolone acetonide 0.5% 0.5 % Crea  Commonly known as:  KENALOG  Apply topically 2 (two) times daily as needed.  Started by:  Guzman Salcedo MD        CHANGE how you take these medications    cholecalciferol (vitamin D3) 50,000 unit capsule  Take 1 capsule (50,000 Units total) by mouth every Monday and Thursday.  Start taking on:  7/11/2019  What changed:  when to take this  Changed by:  Guzman Salcedo MD           Where to Get Your Medications      These medications were sent to [facility] [address] [place] [address]  [address] [place] [id number]    Phone:  [phone]   · cholecalciferol (vitamin D3) 50,000 unit capsule  · triamcinolone acetonide 0.5% 0.5 % Crea         Signing Physician:  Guzman Salcedo MD

## 2020-02-07 ENCOUNTER — PATIENT MESSAGE (OUTPATIENT)
Dept: INTERNAL MEDICINE | Facility: CLINIC | Age: 40
End: 2020-02-07

## 2020-02-07 ENCOUNTER — TELEPHONE (OUTPATIENT)
Dept: INTERNAL MEDICINE | Facility: CLINIC | Age: 40
End: 2020-02-07

## 2020-03-09 ENCOUNTER — TELEPHONE (OUTPATIENT)
Dept: INTERNAL MEDICINE | Facility: CLINIC | Age: 40
End: 2020-03-09

## 2020-03-09 NOTE — TELEPHONE ENCOUNTER
----- Message from Annmarie Lance sent at 3/9/2020  4:05 PM CDT -----  Contact: Patient [phone]  Patient is requesting a call back in reference to taking labs

## 2020-03-11 ENCOUNTER — TELEPHONE (OUTPATIENT)
Dept: INTERNAL MEDICINE | Facility: CLINIC | Age: 40
End: 2020-03-11

## 2020-03-11 NOTE — TELEPHONE ENCOUNTER
----- Message from Yumiko Salas sent at 3/11/2020  3:01 PM CDT -----  Contact: self/ [phone]  Please call patient , she have some questions.

## 2020-03-13 ENCOUNTER — OFFICE VISIT (OUTPATIENT)
Dept: INTERNAL MEDICINE | Facility: CLINIC | Age: 40
End: 2020-03-13
Payer: COMMERCIAL

## 2020-03-13 VITALS
DIASTOLIC BLOOD PRESSURE: 64 MMHG | SYSTOLIC BLOOD PRESSURE: 110 MMHG | OXYGEN SATURATION: 98 % | WEIGHT: 139.75 LBS | TEMPERATURE: 98 F | BODY MASS INDEX: 23.28 KG/M2 | HEIGHT: 65 IN | HEART RATE: 80 BPM

## 2020-03-13 DIAGNOSIS — L56.8 PHOTOSENSITIVITY OF SKIN: ICD-10-CM

## 2020-03-13 DIAGNOSIS — E55.9 VITAMIN D DEFICIENCY: ICD-10-CM

## 2020-03-13 DIAGNOSIS — Z82.49 FAMILY HISTORY OF HEART DISEASE IN MALE FAMILY MEMBER BEFORE AGE 55: ICD-10-CM

## 2020-03-13 DIAGNOSIS — L20.81 ATOPIC NEURODERMATITIS: ICD-10-CM

## 2020-03-13 DIAGNOSIS — Z00.00 ANNUAL PHYSICAL EXAM: Primary | ICD-10-CM

## 2020-03-13 PROCEDURE — 3008F BODY MASS INDEX DOCD: CPT | Mod: CPTII,S$GLB,, | Performed by: INTERNAL MEDICINE

## 2020-03-13 PROCEDURE — 3008F PR BODY MASS INDEX (BMI) DOCUMENTED: ICD-10-PCS | Mod: CPTII,S$GLB,, | Performed by: INTERNAL MEDICINE

## 2020-03-13 PROCEDURE — 99214 OFFICE O/P EST MOD 30 MIN: CPT | Mod: S$GLB,,, | Performed by: INTERNAL MEDICINE

## 2020-03-13 PROCEDURE — 99214 PR OFFICE/OUTPT VISIT, EST, LEVL IV, 30-39 MIN: ICD-10-PCS | Mod: S$GLB,,, | Performed by: INTERNAL MEDICINE

## 2020-03-13 PROCEDURE — 99999 PR PBB SHADOW E&M-EST. PATIENT-LVL III: ICD-10-PCS | Mod: PBBFAC,,, | Performed by: INTERNAL MEDICINE

## 2020-03-13 PROCEDURE — 99999 PR PBB SHADOW E&M-EST. PATIENT-LVL III: CPT | Mod: PBBFAC,,, | Performed by: INTERNAL MEDICINE

## 2020-03-13 RX ORDER — IBUPROFEN 100 MG/5ML
1000 SUSPENSION, ORAL (FINAL DOSE FORM) ORAL DAILY
COMMUNITY
Start: 2020-03-13 | End: 2023-01-18

## 2020-03-13 RX ORDER — ASPIRIN 325 MG
50000 TABLET, DELAYED RELEASE (ENTERIC COATED) ORAL
Qty: 24 CAPSULE | Refills: 4 | Status: SHIPPED | OUTPATIENT
Start: 2020-03-16 | End: 2021-07-23

## 2020-03-13 RX ORDER — TRIAMCINOLONE ACETONIDE 5 MG/G
CREAM TOPICAL 2 TIMES DAILY PRN
Qty: 15 G | Refills: 6 | Status: SHIPPED | OUTPATIENT
Start: 2020-03-13 | End: 2023-01-18

## 2020-03-13 NOTE — PROGRESS NOTES
INTERNAL MEDICINE CLINIC - SAME DAY APPOINTMENT  Progress Note    PRESENTING HISTORY     PCP: Guzman Salcedo MD  Chief Complaint/Reason for Visit:     Chief Complaint   Patient presents with    Annual Exam     History of Present Illness & ROS : Ms. Compa Beard is a 39 y.o. female.    Last physical 2-     Wants to change PCP due to language barrier.     Complains of occasional right back pain.   No dysuria.      PAST HISTORY:     Past Medical History:   Diagnosis Date    Vitamin D deficiency 9/29/2017       History reviewed. No pertinent surgical history.    Family History   Problem Relation Age of Onset    No Known Problems Mother     Heart disease Father 55        CAD s/p stent    No Known Problems Sister     No Known Problems Brother     Breast cancer Neg Hx     Colon cancer Neg Hx     Ovarian cancer Neg Hx     Diabetes Neg Hx     Hypertension Neg Hx        Social History     Socioeconomic History    Marital status:      Spouse name: Not on file    Number of children: 1    Years of education: Not on file    Highest education level: Not on file   Occupational History    Occupation: sales   Social Needs    Financial resource strain: Not on file    Food insecurity:     Worry: Not on file     Inability: Not on file    Transportation needs:     Medical: Not on file     Non-medical: Not on file   Tobacco Use    Smoking status: Never Smoker    Smokeless tobacco: Never Used   Substance and Sexual Activity    Alcohol use: No    Drug use: No    Sexual activity: Yes     Partners: Male     Birth control/protection: None, Condom     Comment:  since 2003   Lifestyle    Physical activity:     Days per week: Not on file     Minutes per session: Not on file    Stress: Not on file   Relationships    Social connections:     Talks on phone: Not on file     Gets together: Not on file     Attends Orthodox service: Not on file     Active member of club or organization: Not on file      Attends meetings of clubs or organizations: Not on file     Relationship status: Not on file   Other Topics Concern    Not on file   Social History Narrative    She sales items on boats.    Lived in [place] for 7 yrs now.        MEDICATIONS & ALLERGIES:     Current Outpatient Medications on File Prior to Visit   Medication Sig Dispense Refill     cholecalciferol, vitamin D3, 50,000 unit capsule Take 1 capsule (50,000 Units total) by mouth every Monday and Thursday. 24 capsule 4     triamcinolone acetonide 0.5% (KENALOG) 0.5 % Crea Apply topically 2 (two) times daily as needed. 15 g 6     No current facility-administered medications on file prior to visit.         Review of patient's allergies indicates:   Allergen Reactions    Fish containing products Swelling     Facial swelling (lips, eyes, all areas)       Medications Reconciliation:   I have reconciled the patient's home medications with the patient/family. I have updated all changes.  Refer to After-Visit Medication List.    OBJECTIVE:     Vital Signs:  Vitals:    03/13/20 0954   BP: 110/64   Pulse: 80   Temp: 98.1 °F (36.7 °C)     Wt Readings from Last 1 Encounters:   03/13/20 0954 63.4 kg (139 lb 12.4 oz)     Body mass index is 23.26 kg/m².     Physical Exam:  General: Well developed, well nourished. No distress.  HEENT: Head is normocephalic, atraumatic; ears are normal.    Eyes: Clear conjunctiva.  Neck: Supple, symmetrical neck; trachea midline.  Lungs: Clear to auscultation bilaterally and normal respiratory effort.  Cardiovascular: Heart with regular rate and rhythm.    Extremities: No LE edema. Pulses 2+ and symmetric.   Abdomen: Abdomen is soft, non-tender non-distended with normal bowel sounds.  No CVA tenderness. Negative Carson's sign  Skin: Skin color, texture, turgor normal. No rashes.  Musculoskeletal: Normal gait.   Lymph Nodes: No cervical or supraclavicular adenopathy.  Neurologic: Normal strength and tone. No focal numbness or  weakness.   Psychiatric: Normal affect. Alert.    Laboratory  Lab Results   Component Value Date    WBC 5.06 02/22/2019    HGB 12.2 02/22/2019    HCT 39.0 02/22/2019     02/22/2019    CHOL 154 02/22/2019    TRIG 86 02/22/2019    HDL 49 02/22/2019    ALT 11 02/22/2019    AST 13 02/22/2019     02/22/2019    K 4.2 02/22/2019     02/22/2019    CREATININE 0.6 02/22/2019    BUN 13 02/22/2019    CO2 24 02/22/2019    TSH 1.535 02/22/2019    HGBA1C 5.4 02/22/2019       ASSESSMENT & PLAN:     Annual physical exam  - Reviewed and updated past and current medical problems.  Discussed treatment of current medical problems    -     CBC auto differential; Future; Expected date: 03/13/2020  -     Comprehensive metabolic panel; Future; Expected date: 03/13/2020  -     Lipid panel; Future; Expected date: 03/13/2020  -     HIV 1/2 Ag/Ab (4th Gen); Future; Expected date: 03/13/2020  -     TSH; Future; Expected date: 03/13/2020  -     Hemoglobin A1c; Future; Expected date: 03/13/2020  -     Vitamin D; Future; Expected date: 09/09/2020  -     ascorbic acid, vitamin C, (VITAMIN C) 1000 MG tablet; Take 1 tablet (1,000 mg total) by mouth once daily.  -     Cancel: Urinalysis  -     Urinalysis; Future; Expected date: 03/13/2020    Vitamin D deficiency  - On vitamin D 50,000 twice weekly.  -     Vitamin D; Future; Expected date: 09/09/2020  -     cholecalciferol, vitamin D3, 1,250 mcg (50,000 unit) capsule; Take 1 capsule (50,000 Units total) by mouth every Monday and Thursday.  Dispense: 24 capsule; Refill: 4    Family history of heart disease in male family member before age 55  - No chest pain symptoms.    Atopic neurodermatitis  Photosensitivity of skin  -     triamcinolone acetonide 0.5% (KENALOG) 0.5 % Crea; Apply topically 2 (two) times daily as needed.  Dispense: 15 g; Refill: 6  -     ascorbic acid, vitamin C, (VITAMIN C) 1000 MG tablet; Take 1 tablet (1,000 mg total) by mouth once daily.    Preventive Health  Maintenance:  Flu Vaccine     Return to Clinic for Follow Up with me:   1 Year      Scheduled Follow-up :  Future Appointments   Date Time Provider Department Center   4/7/2020  9:00 AM Dagmar Betancur MD [facility]i Gerard [facility]       After Visit Medication List :     Medication List           Accurate as of March 13, 2020 10:31 AM. If you have any questions, ask your nurse or doctor.               START taking these medications    ascorbic acid (vitamin C) 1000 MG tablet  Commonly known as:  VITAMIN C  Take 1 tablet (1,000 mg total) by mouth once daily.  Started by:  Guzman Salcedo MD        CONTINUE taking these medications    cholecalciferol (vitamin D3) 1,250 mcg (50,000 unit) capsule  Take 1 capsule (50,000 Units total) by mouth every Monday and Thursday.  Start taking on:  March 16, 2020     triamcinolone acetonide 0.5% 0.5 % Crea  Commonly known as:  KENALOG  Apply topically 2 (two) times daily as needed.           Where to Get Your Medications      These medications were sent to [facility] [address] [place] [id number]    Phone:  [phone]   · cholecalciferol (vitamin D3) 1,250 mcg (50,000 unit) capsule  · triamcinolone acetonide 0.5% 0.5 % Crea     You can get these medications from any pharmacy    You don't need a prescription for these medications  · ascorbic acid (vitamin C) 1000 MG tablet         Signing Physician:  Guzman Salcedo MD

## 2020-03-14 ENCOUNTER — LAB VISIT (OUTPATIENT)
Dept: LAB | Facility: HOSPITAL | Age: 40
End: 2020-03-14
Attending: INTERNAL MEDICINE
Payer: COMMERCIAL

## 2020-03-14 DIAGNOSIS — E55.9 VITAMIN D DEFICIENCY: ICD-10-CM

## 2020-03-14 DIAGNOSIS — Z00.00 ANNUAL PHYSICAL EXAM: ICD-10-CM

## 2020-03-14 LAB
25(OH)D3+25(OH)D2 SERPL-MCNC: 74 NG/ML (ref 30–96)
ALBUMIN SERPL BCP-MCNC: 4 G/DL (ref 3.5–5.2)
ALP SERPL-CCNC: 61 U/L (ref 55–135)
ALT SERPL W/O P-5'-P-CCNC: 14 U/L (ref 10–44)
ANION GAP SERPL CALC-SCNC: 10 MMOL/L (ref 8–16)
AST SERPL-CCNC: 16 U/L (ref 10–40)
BASOPHILS # BLD AUTO: 0.04 K/UL (ref 0–0.2)
BASOPHILS NFR BLD: 0.5 % (ref 0–1.9)
BILIRUB SERPL-MCNC: 0.5 MG/DL (ref 0.1–1)
BUN SERPL-MCNC: 10 MG/DL (ref 6–20)
CALCIUM SERPL-MCNC: 9.6 MG/DL (ref 8.7–10.5)
CHLORIDE SERPL-SCNC: 105 MMOL/L (ref 95–110)
CHOLEST SERPL-MCNC: 187 MG/DL (ref 120–199)
CHOLEST/HDLC SERPL: 3.3 {RATIO} (ref 2–5)
CO2 SERPL-SCNC: 23 MMOL/L (ref 23–29)
CREAT SERPL-MCNC: 0.7 MG/DL (ref 0.5–1.4)
DIFFERENTIAL METHOD: ABNORMAL
EOSINOPHIL # BLD AUTO: 0.3 K/UL (ref 0–0.5)
EOSINOPHIL NFR BLD: 4.5 % (ref 0–8)
ERYTHROCYTE [DISTWIDTH] IN BLOOD BY AUTOMATED COUNT: 12.4 % (ref 11.5–14.5)
EST. GFR  (AFRICAN AMERICAN): >60 ML/MIN/1.73 M^2
EST. GFR  (NON AFRICAN AMERICAN): >60 ML/MIN/1.73 M^2
ESTIMATED AVG GLUCOSE: 111 MG/DL (ref 68–131)
GLUCOSE SERPL-MCNC: 99 MG/DL (ref 70–110)
HBA1C MFR BLD HPLC: 5.5 % (ref 4–5.6)
HCT VFR BLD AUTO: 41.6 % (ref 37–48.5)
HDLC SERPL-MCNC: 57 MG/DL (ref 40–75)
HDLC SERPL: 30.5 % (ref 20–50)
HGB BLD-MCNC: 12.9 G/DL (ref 12–16)
IMM GRANULOCYTES # BLD AUTO: 0.01 K/UL (ref 0–0.04)
IMM GRANULOCYTES NFR BLD AUTO: 0.1 % (ref 0–0.5)
LDLC SERPL CALC-MCNC: 105.8 MG/DL (ref 63–159)
LYMPHOCYTES # BLD AUTO: 2.5 K/UL (ref 1–4.8)
LYMPHOCYTES NFR BLD: 34 % (ref 18–48)
MCH RBC QN AUTO: 29.7 PG (ref 27–31)
MCHC RBC AUTO-ENTMCNC: 31 G/DL (ref 32–36)
MCV RBC AUTO: 96 FL (ref 82–98)
MONOCYTES # BLD AUTO: 0.7 K/UL (ref 0.3–1)
MONOCYTES NFR BLD: 8.8 % (ref 4–15)
NEUTROPHILS # BLD AUTO: 3.8 K/UL (ref 1.8–7.7)
NEUTROPHILS NFR BLD: 52.1 % (ref 38–73)
NONHDLC SERPL-MCNC: 130 MG/DL
NRBC BLD-RTO: 0 /100 WBC
PLATELET # BLD AUTO: 261 K/UL (ref 150–350)
PMV BLD AUTO: 11.6 FL (ref 9.2–12.9)
POTASSIUM SERPL-SCNC: 4.4 MMOL/L (ref 3.5–5.1)
PROT SERPL-MCNC: 7.9 G/DL (ref 6–8.4)
RBC # BLD AUTO: 4.35 M/UL (ref 4–5.4)
SODIUM SERPL-SCNC: 138 MMOL/L (ref 136–145)
TRIGL SERPL-MCNC: 121 MG/DL (ref 30–150)
TSH SERPL DL<=0.005 MIU/L-ACNC: 1.85 UIU/ML (ref 0.4–4)
WBC # BLD AUTO: 7.36 K/UL (ref 3.9–12.7)

## 2020-03-14 PROCEDURE — 80061 LIPID PANEL: CPT

## 2020-03-14 PROCEDURE — 82306 VITAMIN D 25 HYDROXY: CPT

## 2020-03-14 PROCEDURE — 84443 ASSAY THYROID STIM HORMONE: CPT

## 2020-03-14 PROCEDURE — 86703 HIV-1/HIV-2 1 RESULT ANTBDY: CPT

## 2020-03-14 PROCEDURE — 85025 COMPLETE CBC W/AUTO DIFF WBC: CPT

## 2020-03-14 PROCEDURE — 36415 COLL VENOUS BLD VENIPUNCTURE: CPT

## 2020-03-14 PROCEDURE — 80053 COMPREHEN METABOLIC PANEL: CPT

## 2020-03-14 PROCEDURE — 83036 HEMOGLOBIN GLYCOSYLATED A1C: CPT

## 2020-03-16 LAB — HIV 1+2 AB+HIV1 P24 AG SERPL QL IA: NEGATIVE

## 2020-07-29 ENCOUNTER — OFFICE VISIT (OUTPATIENT)
Dept: INTERNAL MEDICINE | Facility: CLINIC | Age: 40
End: 2020-07-29
Payer: COMMERCIAL

## 2020-07-29 VITALS
HEART RATE: 81 BPM | DIASTOLIC BLOOD PRESSURE: 60 MMHG | BODY MASS INDEX: 22.63 KG/M2 | HEIGHT: 65 IN | OXYGEN SATURATION: 96 % | SYSTOLIC BLOOD PRESSURE: 110 MMHG | WEIGHT: 135.81 LBS

## 2020-07-29 DIAGNOSIS — J45.21 MILD INTERMITTENT CHRONIC ASTHMA WITH ACUTE EXACERBATION: ICD-10-CM

## 2020-07-29 PROBLEM — J45.901 CHRONIC ASTHMA WITH ACUTE EXACERBATION: Status: ACTIVE | Noted: 2020-07-29

## 2020-07-29 PROCEDURE — 99999 PR PBB SHADOW E&M-EST. PATIENT-LVL III: CPT | Mod: PBBFAC,,, | Performed by: INTERNAL MEDICINE

## 2020-07-29 PROCEDURE — 99214 OFFICE O/P EST MOD 30 MIN: CPT | Mod: S$GLB,,, | Performed by: INTERNAL MEDICINE

## 2020-07-29 PROCEDURE — 3008F PR BODY MASS INDEX (BMI) DOCUMENTED: ICD-10-PCS | Mod: CPTII,S$GLB,, | Performed by: INTERNAL MEDICINE

## 2020-07-29 PROCEDURE — 99999 PR PBB SHADOW E&M-EST. PATIENT-LVL III: ICD-10-PCS | Mod: PBBFAC,,, | Performed by: INTERNAL MEDICINE

## 2020-07-29 PROCEDURE — 99214 PR OFFICE/OUTPT VISIT, EST, LEVL IV, 30-39 MIN: ICD-10-PCS | Mod: S$GLB,,, | Performed by: INTERNAL MEDICINE

## 2020-07-29 PROCEDURE — 3008F BODY MASS INDEX DOCD: CPT | Mod: CPTII,S$GLB,, | Performed by: INTERNAL MEDICINE

## 2020-07-29 RX ORDER — MONTELUKAST SODIUM 10 MG/1
10 TABLET ORAL NIGHTLY
Qty: 30 TABLET | Refills: 11 | Status: SHIPPED | OUTPATIENT
Start: 2020-07-29 | End: 2021-07-23

## 2020-07-29 RX ORDER — ALBUTEROL SULFATE 90 UG/1
2 AEROSOL, METERED RESPIRATORY (INHALATION) EVERY 6 HOURS PRN
Qty: 18 G | Refills: 11 | Status: SHIPPED | OUTPATIENT
Start: 2020-07-29 | End: 2022-07-20 | Stop reason: SDUPTHER

## 2020-07-29 NOTE — PROGRESS NOTES
INTERNAL MEDICINE CLINIC  Follow-up Visit Progress Note     PRESENTING HISTORY     PCP: Guzman Salcedo MD    Last Clinic Visit with me:  3-    Current Chief Complaint/Problem:  No chief complaint on file.    History of Present Illness & ROS: Ms. Compa Beard is a 40 y.o. female.    Had a cold recently. Took amoxicillin.  Recovering.    She still feels occasionally SOB.  Mild ROBISON.  No cough.  No sinus congestion.    PAST HISTORY:     Past Medical History:   Diagnosis Date    Chronic asthma with acute exacerbation 7/29/2020    Vitamin D deficiency 9/29/2017       No past surgical history on file.    Family History   Problem Relation Age of Onset    No Known Problems Mother     Heart disease Father 55        CAD s/p stent    No Known Problems Sister     No Known Problems Brother     Breast cancer Neg Hx     Colon cancer Neg Hx     Ovarian cancer Neg Hx     Diabetes Neg Hx     Hypertension Neg Hx        Social History     Socioeconomic History    Marital status:      Spouse name: Not on file    Number of children: 1    Years of education: Not on file    Highest education level: Not on file   Occupational History    Occupation: sales   Social Needs    Financial resource strain: Not on file    Food insecurity     Worry: Not on file     Inability: Not on file    Transportation needs     Medical: Not on file     Non-medical: Not on file   Tobacco Use    Smoking status: Never Smoker    Smokeless tobacco: Never Used   Substance and Sexual Activity    Alcohol use: No    Drug use: No    Sexual activity: Yes     Partners: Male     Birth control/protection: None, Condom     Comment:  since 2003   Lifestyle    Physical activity     Days per week: Not on file     Minutes per session: Not on file    Stress: Not on file   Relationships    Social connections     Talks on phone: Not on file     Gets together: Not on file     Attends Bahai service: Not on file     Active member of club or  organization: Not on file     Attends meetings of clubs or organizations: Not on file     Relationship status: Not on file   Other Topics Concern    Not on file   Social History Narrative    She sales items on boats.    Lived in [place] for 7 yrs now.        MEDICATIONS & ALLERGIES:     Current Outpatient Medications on File Prior to Visit   Medication Sig Dispense Refill    ascorbic acid, vitamin C, (VITAMIN C) 1000 MG tablet Take 1 tablet (1,000 mg total) by mouth once daily. (Patient not taking: Reported on 7/29/2020)      cholecalciferol, vitamin D3, 1,250 mcg (50,000 unit) capsule Take 1 capsule (50,000 Units total) by mouth every Monday and Thursday. (Patient not taking: Reported on 7/29/2020) 24 capsule 4    triamcinolone acetonide 0.5% (KENALOG) 0.5 % Crea Apply topically 2 (two) times daily as needed. (Patient not taking: Reported on 7/29/2020) 15 g 6     No current facility-administered medications on file prior to visit.         Review of patient's allergies indicates:   Allergen Reactions    Fish containing products Swelling     Facial swelling (lips, eyes, all areas)       Medications Reconciliation:   I have reconciled the patient's home medications and discharge medications with the patient/family. I have updated all changes.  Refer to After-Visit Medication List.    OBJECTIVE:     Vital Signs:  Vitals:    07/29/20 1531   BP: 110/60   Pulse: 81     Wt Readings from Last 1 Encounters:   07/29/20 1531 61.6 kg (135 lb 12.9 oz)     Body mass index is 22.6 kg/m².   Oxygen 96%.    Physical Exam:  General: Well developed, well nourished. No distress.  HEENT: Head is normocephalic, atraumatic.  Ears: both external ears are normal.  TMs are normal bilaterally.  Pharynx - moderately swollen and erythematous.  Sinus - moderate tenderness on palpation.   Eyes: Clear conjunctiva bilaterally.  Neck: Supple, symmetrical neck; trachea midline.  Lungs: Clear to auscultation bilaterally and normal respiratory  effort.  Cardiovascular: Heart with regular rate and rhythm.      Laboratory  Lab Results   Component Value Date    WBC 7.36 03/14/2020    HGB 12.9 03/14/2020    HCT 41.6 03/14/2020     03/14/2020    CHOL 187 03/14/2020    TRIG 121 03/14/2020    HDL 57 03/14/2020    ALT 14 03/14/2020    AST 16 03/14/2020     03/14/2020    K 4.4 03/14/2020     03/14/2020    CREATININE 0.7 03/14/2020    BUN 10 03/14/2020    CO2 23 03/14/2020    TSH 1.849 03/14/2020    HGBA1C 5.5 03/14/2020       ASSESSMENT & PLAN:     Mild intermittent chronic asthma with acute exacerbation  -     albuterol (VENTOLIN HFA) 90 mcg/actuation inhaler; Inhale 2 puffs into the lungs every 6 (six) hours as needed for Wheezing.  Dispense: 18 g; Refill: 11  -     montelukast (SINGULAIR) 10 mg tablet; Take 1 tablet (10 mg total) by mouth every evening.  Dispense: 30 tablet; Refill: 11      Vitamin D deficiency  - On vitamin D 50,000 twice weekly.    Family history of heart disease in male family member before age 55  - No chest pain symptoms.     Atopic neurodermatitis  Photosensitivity of skin  -     triamcinolone acetonide 0.5% (KENALOG) 0.5 % Crea; Apply topically 2 (two) times daily as needed.  Dispense: 15 g; Refill: 6  -     ascorbic acid, vitamin C, (VITAMIN C) 1000 MG tablet; Take 1 tablet (1,000 mg total) by mouth once daily.     Preventive Health Maintenance:  Up to date     Return to Clinic for Follow Up with me:  1 Year     Scheduled Follow-up :  No future appointments.    After Visit Medication List :     Medication List          Accurate as of July 29, 2020  3:49 PM. If you have any questions, ask your nurse or doctor.            START taking these medications    albuterol 90 mcg/actuation inhaler  Commonly known as: VENTOLIN HFA  Inhale 2 puffs into the lungs every 6 (six) hours as needed for Wheezing.  Started by: Guzman Salcedo MD     montelukast 10 mg tablet  Commonly known as: SINGULAIR  Take 1 tablet (10 mg total) by mouth  every evening.  Started by: Guzman Salcedo MD        CONTINUE taking these medications    ascorbic acid (vitamin C) 1000 MG tablet  Commonly known as: VITAMIN C  Take 1 tablet (1,000 mg total) by mouth once daily.     cholecalciferol (vitamin D3) 1,250 mcg (50,000 unit) capsule  Take 1 capsule (50,000 Units total) by mouth every Monday and Thursday.     triamcinolone acetonide 0.5% 0.5 % Crea  Commonly known as: KENALOG  Apply topically 2 (two) times daily as needed.           Where to Get Your Medications      These medications were sent to [facility] [address] [place], [place] [address] [facility] [address] [facility]  [address] [place], [place] [address] [address]    Phone: [phone]   · albuterol 90 mcg/actuation inhaler  · montelukast 10 mg tablet         Signing Physician:  Guzman Salcedo MD

## 2021-01-21 DIAGNOSIS — Z12.31 OTHER SCREENING MAMMOGRAM: ICD-10-CM

## 2021-04-05 ENCOUNTER — PATIENT MESSAGE (OUTPATIENT)
Dept: ADMINISTRATIVE | Facility: HOSPITAL | Age: 41
End: 2021-04-05

## 2021-07-07 ENCOUNTER — PATIENT MESSAGE (OUTPATIENT)
Dept: ADMINISTRATIVE | Facility: HOSPITAL | Age: 41
End: 2021-07-07

## 2021-07-23 ENCOUNTER — OFFICE VISIT (OUTPATIENT)
Dept: INTERNAL MEDICINE | Facility: CLINIC | Age: 41
End: 2021-07-23
Payer: COMMERCIAL

## 2021-07-23 VITALS
WEIGHT: 133.81 LBS | BODY MASS INDEX: 22.3 KG/M2 | HEART RATE: 65 BPM | OXYGEN SATURATION: 98 % | HEIGHT: 65 IN | SYSTOLIC BLOOD PRESSURE: 102 MMHG | DIASTOLIC BLOOD PRESSURE: 60 MMHG

## 2021-07-23 DIAGNOSIS — L56.8 PHOTOSENSITIVITY OF SKIN: ICD-10-CM

## 2021-07-23 DIAGNOSIS — Z00.00 ANNUAL PHYSICAL EXAM: Primary | ICD-10-CM

## 2021-07-23 DIAGNOSIS — Z12.31 ENCOUNTER FOR SCREENING MAMMOGRAM FOR BREAST CANCER: ICD-10-CM

## 2021-07-23 DIAGNOSIS — Z82.49 FAMILY HISTORY OF HEART DISEASE IN MALE FAMILY MEMBER BEFORE AGE 55: ICD-10-CM

## 2021-07-23 DIAGNOSIS — Z11.3 SCREENING FOR STD (SEXUALLY TRANSMITTED DISEASE): ICD-10-CM

## 2021-07-23 DIAGNOSIS — E55.9 VITAMIN D DEFICIENCY: ICD-10-CM

## 2021-07-23 DIAGNOSIS — L20.81 ATOPIC NEURODERMATITIS: ICD-10-CM

## 2021-07-23 DIAGNOSIS — J45.20 MILD INTERMITTENT ASTHMA WITHOUT COMPLICATION: ICD-10-CM

## 2021-07-23 DIAGNOSIS — Z91.013 FISH ALLERGY: ICD-10-CM

## 2021-07-23 DIAGNOSIS — R10.11 RIGHT UPPER QUADRANT PAIN: ICD-10-CM

## 2021-07-23 PROBLEM — J45.30 MILD PERSISTENT ASTHMA WITHOUT COMPLICATION: Status: ACTIVE | Noted: 2020-07-29

## 2021-07-23 PROBLEM — J45.909 CHRONIC ASTHMA WITHOUT COMPLICATION: Status: ACTIVE | Noted: 2020-07-29

## 2021-07-23 PROCEDURE — 1126F AMNT PAIN NOTED NONE PRSNT: CPT | Mod: CPTII,S$GLB,, | Performed by: INTERNAL MEDICINE

## 2021-07-23 PROCEDURE — 99999 PR PBB SHADOW E&M-EST. PATIENT-LVL IV: ICD-10-PCS | Mod: PBBFAC,,, | Performed by: INTERNAL MEDICINE

## 2021-07-23 PROCEDURE — 3008F BODY MASS INDEX DOCD: CPT | Mod: CPTII,S$GLB,, | Performed by: INTERNAL MEDICINE

## 2021-07-23 PROCEDURE — 99999 PR PBB SHADOW E&M-EST. PATIENT-LVL IV: CPT | Mod: PBBFAC,,, | Performed by: INTERNAL MEDICINE

## 2021-07-23 PROCEDURE — 1126F PR PAIN SEVERITY QUANTIFIED, NO PAIN PRESENT: ICD-10-PCS | Mod: CPTII,S$GLB,, | Performed by: INTERNAL MEDICINE

## 2021-07-23 PROCEDURE — 3008F PR BODY MASS INDEX (BMI) DOCUMENTED: ICD-10-PCS | Mod: CPTII,S$GLB,, | Performed by: INTERNAL MEDICINE

## 2021-07-23 PROCEDURE — 99214 OFFICE O/P EST MOD 30 MIN: CPT | Mod: S$GLB,,, | Performed by: INTERNAL MEDICINE

## 2021-07-23 PROCEDURE — 99214 PR OFFICE/OUTPT VISIT, EST, LEVL IV, 30-39 MIN: ICD-10-PCS | Mod: S$GLB,,, | Performed by: INTERNAL MEDICINE

## 2021-07-23 RX ORDER — EPINEPHRINE 0.3 MG/.3ML
1 INJECTION SUBCUTANEOUS ONCE
Qty: 0.3 ML | Refills: 0 | Status: SHIPPED | OUTPATIENT
Start: 2021-07-23 | End: 2021-07-23

## 2021-07-23 RX ORDER — ACETAMINOPHEN 500 MG
5000 TABLET ORAL DAILY
COMMUNITY
Start: 2021-07-23 | End: 2023-01-18

## 2021-08-04 ENCOUNTER — HOSPITAL ENCOUNTER (OUTPATIENT)
Dept: RADIOLOGY | Facility: HOSPITAL | Age: 41
Discharge: HOME | End: 2021-08-04
Attending: INTERNAL MEDICINE
Payer: COMMERCIAL

## 2021-08-04 DIAGNOSIS — R10.11 RIGHT UPPER QUADRANT PAIN: ICD-10-CM

## 2021-08-04 PROCEDURE — 76700 US EXAM ABDOM COMPLETE: CPT | Mod: 26,,, | Performed by: RADIOLOGY

## 2021-08-04 PROCEDURE — 76700 US ABDOMEN COMPLETE: ICD-10-PCS | Mod: 26,,, | Performed by: RADIOLOGY

## 2021-08-04 PROCEDURE — 76700 US EXAM ABDOM COMPLETE: CPT | Mod: TC

## 2021-08-05 ENCOUNTER — TELEPHONE (OUTPATIENT)
Dept: INTERNAL MEDICINE | Facility: CLINIC | Age: 41
End: 2021-08-05

## 2021-08-05 ENCOUNTER — PATIENT MESSAGE (OUTPATIENT)
Dept: INTERNAL MEDICINE | Facility: CLINIC | Age: 41
End: 2021-08-05

## 2021-08-05 DIAGNOSIS — A04.8 HELICOBACTER PYLORI INFECTION: Primary | ICD-10-CM

## 2021-08-05 DIAGNOSIS — N30.90 CYSTITIS: Primary | ICD-10-CM

## 2021-08-05 RX ORDER — PANTOPRAZOLE SODIUM 40 MG/1
40 TABLET, DELAYED RELEASE ORAL 2 TIMES DAILY
Qty: 28 TABLET | Refills: 0 | Status: SHIPPED | OUTPATIENT
Start: 2021-08-05 | End: 2021-12-16

## 2021-08-05 RX ORDER — METRONIDAZOLE 500 MG/1
500 TABLET ORAL EVERY 8 HOURS
Qty: 42 TABLET | Refills: 0 | Status: SHIPPED | OUTPATIENT
Start: 2021-08-05 | End: 2021-08-19

## 2021-08-05 RX ORDER — NITROFURANTOIN (MACROCRYSTALS) 100 MG/1
100 CAPSULE ORAL EVERY 12 HOURS
Qty: 14 CAPSULE | Refills: 0 | Status: SHIPPED | OUTPATIENT
Start: 2021-08-05 | End: 2021-08-12

## 2021-08-05 RX ORDER — CLARITHROMYCIN 500 MG/1
500 TABLET, FILM COATED ORAL EVERY 12 HOURS
Qty: 28 TABLET | Refills: 0 | Status: SHIPPED | OUTPATIENT
Start: 2021-08-05 | End: 2021-08-19

## 2021-12-16 ENCOUNTER — OFFICE VISIT (OUTPATIENT)
Dept: INTERNAL MEDICINE | Facility: CLINIC | Age: 41
End: 2021-12-16
Payer: COMMERCIAL

## 2021-12-16 VITALS
HEIGHT: 65 IN | HEART RATE: 74 BPM | DIASTOLIC BLOOD PRESSURE: 60 MMHG | OXYGEN SATURATION: 99 % | SYSTOLIC BLOOD PRESSURE: 100 MMHG | BODY MASS INDEX: 23.36 KG/M2 | WEIGHT: 140.19 LBS

## 2021-12-16 DIAGNOSIS — J35.8 TONSILLITH: ICD-10-CM

## 2021-12-16 DIAGNOSIS — A04.8 HELICOBACTER PYLORI INFECTION: ICD-10-CM

## 2021-12-16 DIAGNOSIS — L56.8 PHOTOSENSITIVITY OF SKIN: ICD-10-CM

## 2021-12-16 DIAGNOSIS — J45.20 MILD INTERMITTENT ASTHMA WITHOUT COMPLICATION: Primary | ICD-10-CM

## 2021-12-16 DIAGNOSIS — L20.81 ATOPIC NEURODERMATITIS: ICD-10-CM

## 2021-12-16 DIAGNOSIS — Z91.013 FISH ALLERGY: ICD-10-CM

## 2021-12-16 DIAGNOSIS — Z82.49 FAMILY HISTORY OF HEART DISEASE IN MALE FAMILY MEMBER BEFORE AGE 55: ICD-10-CM

## 2021-12-16 DIAGNOSIS — E55.9 VITAMIN D DEFICIENCY: ICD-10-CM

## 2021-12-16 PROCEDURE — 99214 PR OFFICE/OUTPT VISIT, EST, LEVL IV, 30-39 MIN: ICD-10-PCS | Mod: S$GLB,,, | Performed by: INTERNAL MEDICINE

## 2021-12-16 PROCEDURE — 99999 PR PBB SHADOW E&M-EST. PATIENT-LVL III: CPT | Mod: PBBFAC,,, | Performed by: INTERNAL MEDICINE

## 2021-12-16 PROCEDURE — 99214 OFFICE O/P EST MOD 30 MIN: CPT | Mod: S$GLB,,, | Performed by: INTERNAL MEDICINE

## 2021-12-16 PROCEDURE — 99999 PR PBB SHADOW E&M-EST. PATIENT-LVL III: ICD-10-PCS | Mod: PBBFAC,,, | Performed by: INTERNAL MEDICINE

## 2021-12-17 ENCOUNTER — TELEPHONE (OUTPATIENT)
Dept: OTOLARYNGOLOGY | Facility: CLINIC | Age: 41
End: 2021-12-17
Payer: COMMERCIAL

## 2021-12-17 ENCOUNTER — LAB VISIT (OUTPATIENT)
Dept: LAB | Facility: HOSPITAL | Age: 41
End: 2021-12-17
Attending: INTERNAL MEDICINE
Payer: COMMERCIAL

## 2021-12-17 DIAGNOSIS — A04.8 HELICOBACTER PYLORI INFECTION: ICD-10-CM

## 2021-12-17 PROCEDURE — 87338 HPYLORI STOOL AG IA: CPT | Performed by: INTERNAL MEDICINE

## 2021-12-21 LAB
H PYLORI AG STL QL IA: NORMAL
H PYLORI AG STL QL IA: NORMAL
SPECIMEN SOURCE: NORMAL
SPECIMEN SOURCE: NORMAL

## 2022-07-20 ENCOUNTER — TELEPHONE (OUTPATIENT)
Dept: INTERNAL MEDICINE | Facility: CLINIC | Age: 42
End: 2022-07-20
Payer: COMMERCIAL

## 2022-07-20 DIAGNOSIS — J45.21 MILD INTERMITTENT CHRONIC ASTHMA WITH ACUTE EXACERBATION: ICD-10-CM

## 2022-07-20 DIAGNOSIS — Z12.31 ENCOUNTER FOR SCREENING MAMMOGRAM FOR BREAST CANCER: Primary | ICD-10-CM

## 2022-07-20 RX ORDER — ALBUTEROL SULFATE 90 UG/1
2 AEROSOL, METERED RESPIRATORY (INHALATION) EVERY 6 HOURS PRN
Qty: 18 G | Refills: 11 | Status: SHIPPED | OUTPATIENT
Start: 2022-07-20

## 2022-10-10 ENCOUNTER — PATIENT MESSAGE (OUTPATIENT)
Dept: ADMINISTRATIVE | Facility: HOSPITAL | Age: 42
End: 2022-10-10
Payer: COMMERCIAL

## 2023-01-18 ENCOUNTER — OFFICE VISIT (OUTPATIENT)
Dept: INTERNAL MEDICINE | Facility: CLINIC | Age: 43
End: 2023-01-18
Payer: COMMERCIAL

## 2023-01-18 VITALS
OXYGEN SATURATION: 98 % | HEART RATE: 74 BPM | SYSTOLIC BLOOD PRESSURE: 102 MMHG | HEIGHT: 65 IN | DIASTOLIC BLOOD PRESSURE: 62 MMHG | BODY MASS INDEX: 20.97 KG/M2 | WEIGHT: 125.88 LBS

## 2023-01-18 DIAGNOSIS — Z23 NEED FOR PNEUMOCOCCAL VACCINATION: ICD-10-CM

## 2023-01-18 DIAGNOSIS — Z82.49 FAMILY HISTORY OF HEART DISEASE IN MALE FAMILY MEMBER BEFORE AGE 55: ICD-10-CM

## 2023-01-18 DIAGNOSIS — Z86.19 HISTORY OF HELICOBACTER INFECTION: ICD-10-CM

## 2023-01-18 DIAGNOSIS — E55.9 VITAMIN D DEFICIENCY: ICD-10-CM

## 2023-01-18 DIAGNOSIS — Z91.013 FISH ALLERGY: ICD-10-CM

## 2023-01-18 DIAGNOSIS — L56.8 PHOTOSENSITIVITY OF SKIN: ICD-10-CM

## 2023-01-18 DIAGNOSIS — J45.20 MILD INTERMITTENT ASTHMA WITHOUT COMPLICATION: ICD-10-CM

## 2023-01-18 DIAGNOSIS — Z12.31 ENCOUNTER FOR SCREENING MAMMOGRAM FOR BREAST CANCER: ICD-10-CM

## 2023-01-18 DIAGNOSIS — Z00.00 ANNUAL PHYSICAL EXAM: Primary | ICD-10-CM

## 2023-01-18 DIAGNOSIS — L20.81 ATOPIC NEURODERMATITIS: ICD-10-CM

## 2023-01-18 PROCEDURE — 99999 PR PBB SHADOW E&M-EST. PATIENT-LVL III: ICD-10-PCS | Mod: PBBFAC,,, | Performed by: INTERNAL MEDICINE

## 2023-01-18 PROCEDURE — 1159F MED LIST DOCD IN RCRD: CPT | Mod: CPTII,S$GLB,, | Performed by: INTERNAL MEDICINE

## 2023-01-18 PROCEDURE — 90471 PNEUMOCOCCAL POLYSACCHARIDE VACCINE 23-VALENT =>2YO SQ IM: ICD-10-PCS | Mod: S$GLB,,, | Performed by: INTERNAL MEDICINE

## 2023-01-18 PROCEDURE — 3074F PR MOST RECENT SYSTOLIC BLOOD PRESSURE < 130 MM HG: ICD-10-PCS | Mod: CPTII,S$GLB,, | Performed by: INTERNAL MEDICINE

## 2023-01-18 PROCEDURE — 3008F BODY MASS INDEX DOCD: CPT | Mod: CPTII,S$GLB,, | Performed by: INTERNAL MEDICINE

## 2023-01-18 PROCEDURE — 90471 IMMUNIZATION ADMIN: CPT | Mod: S$GLB,,, | Performed by: INTERNAL MEDICINE

## 2023-01-18 PROCEDURE — 3008F PR BODY MASS INDEX (BMI) DOCUMENTED: ICD-10-PCS | Mod: CPTII,S$GLB,, | Performed by: INTERNAL MEDICINE

## 2023-01-18 PROCEDURE — 3078F PR MOST RECENT DIASTOLIC BLOOD PRESSURE < 80 MM HG: ICD-10-PCS | Mod: CPTII,S$GLB,, | Performed by: INTERNAL MEDICINE

## 2023-01-18 PROCEDURE — 3078F DIAST BP <80 MM HG: CPT | Mod: CPTII,S$GLB,, | Performed by: INTERNAL MEDICINE

## 2023-01-18 PROCEDURE — 90732 PNEUMOCOCCAL POLYSACCHARIDE VACCINE 23-VALENT =>2YO SQ IM: ICD-10-PCS | Mod: S$GLB,,, | Performed by: INTERNAL MEDICINE

## 2023-01-18 PROCEDURE — 3074F SYST BP LT 130 MM HG: CPT | Mod: CPTII,S$GLB,, | Performed by: INTERNAL MEDICINE

## 2023-01-18 PROCEDURE — 99214 PR OFFICE/OUTPT VISIT, EST, LEVL IV, 30-39 MIN: ICD-10-PCS | Mod: 25,S$GLB,, | Performed by: INTERNAL MEDICINE

## 2023-01-18 PROCEDURE — 90732 PPSV23 VACC 2 YRS+ SUBQ/IM: CPT | Mod: S$GLB,,, | Performed by: INTERNAL MEDICINE

## 2023-01-18 PROCEDURE — 1159F PR MEDICATION LIST DOCUMENTED IN MEDICAL RECORD: ICD-10-PCS | Mod: CPTII,S$GLB,, | Performed by: INTERNAL MEDICINE

## 2023-01-18 PROCEDURE — 99214 OFFICE O/P EST MOD 30 MIN: CPT | Mod: 25,S$GLB,, | Performed by: INTERNAL MEDICINE

## 2023-01-18 PROCEDURE — 99999 PR PBB SHADOW E&M-EST. PATIENT-LVL III: CPT | Mod: PBBFAC,,, | Performed by: INTERNAL MEDICINE

## 2023-01-18 RX ORDER — FAMOTIDINE 40 MG/1
40 TABLET, FILM COATED ORAL 2 TIMES DAILY PRN
Qty: 60 TABLET | Refills: 11 | Status: SHIPPED | OUTPATIENT
Start: 2023-01-18 | End: 2023-07-05

## 2023-01-18 NOTE — PROGRESS NOTES
INTERNAL MEDICINE CLINIC  Follow-up Visit Progress Note     PRESENTING HISTORY     PCP: Guzman Salcedo MD    Last Clinic Visit with me:  12-    Current Chief Complaint/Problem:    Chief Complaint   Patient presents with    Annual Exam     History of Present Illness & ROS: Ms. Compa Beard is a 42 y.o. female.    Had COVID last year in the summer.    Review of Systems:  Review of Systems   Constitutional:  Negative for chills and fever.   Respiratory:  Negative for shortness of breath.    Cardiovascular:  Negative for chest pain and leg swelling.   Gastrointestinal:  Positive for abdominal pain (occasioanl RUQ pain).   Genitourinary:  Negative for hematuria.   Musculoskeletal:  Negative for joint pain.   Neurological:  Negative for headaches.   Psychiatric/Behavioral:  Negative for depression.        PAST HISTORY:     Past Medical History:   Diagnosis Date    Atopic neurodermatitis 07/10/2019    COVID-19 06/15/2022    Family history of heart disease in male family member before age 55 2019    Fish allergy 2021    Helicobacter pylori infection 2021 H. Pylori Triple Therapy: 1. Take Pantoprazole 40 m tablet twice daily for 14 days. 2. Take Clarithromycin 500 m tablet twice daily for 14 days. 3. Take Metronidazole 500 m tablet three times daily for 14 days.  Do not drink alcohol during this treatment.     Mild intermittent asthma without complication 2020    Photosensitivity of skin 07/10/2019    Vitamin D deficiency 2017       No past surgical history on file.    Family History   Problem Relation Age of Onset    No Known Problems Mother     Heart disease Father 55        CAD s/p stent    No Known Problems Sister     No Known Problems Brother     Breast cancer Neg Hx     Colon cancer Neg Hx     Ovarian cancer Neg Hx     Diabetes Neg Hx     Hypertension Neg Hx        Social History     Socioeconomic History    Marital status:     Number of children: 1    Occupational History    Occupation: sales   Tobacco Use    Smoking status: Never    Smokeless tobacco: Never   Substance and Sexual Activity    Alcohol use: No    Drug use: No    Sexual activity: Yes     Partners: Male     Birth control/protection: None, Condom     Comment:  since 2003   Social History Narrative    She sales items on boats.    : 1 child.    Lived in [place] for 7 yrs now (as of 2020)       MEDICATIONS & ALLERGIES:     Current Outpatient Medications on File Prior to Visit   Medication Sig Dispense Refill    albuterol (VENTOLIN HFA) 90 mcg/actuation inhaler Inhale 2 puffs into the lungs every 6 (six) hours as needed for Wheezing. (Patient not taking: Reported on 1/18/2023) 18 g 11    EPINEPHrine (EPIPEN) 0.3 mg/0.3 mL AtIn Inject 0.3 mLs (0.3 mg total) into the muscle once. for 1 dose 0.3 mL 0     Review of patient's allergies indicates:   Allergen Reactions    Fish containing products Swelling     Facial swelling (lips, eyes, all areas)       Medications Reconciliation:   I have reconciled the patient's home medications and discharge medications with the patient/family. I have updated all changes.  Refer to After-Visit Medication List.    OBJECTIVE:     Vital Signs:  Vitals:    01/18/23 1406   BP: 102/62   Pulse: 74     Wt Readings from Last 3 Encounters:   01/18/23 1406 57.1 kg (125 lb 14.1 oz)   12/16/21 1750 63.6 kg (140 lb 3.4 oz)   08/04/21 1143 60.7 kg (133 lb 13.1 oz)     Body mass index is 20.95 kg/m².     Physical Exam:  General: Well developed, well nourished. No distress.  HEENT: Head is normocephalic, atraumatic   Eyes: Clear conjunctiva.  Neck: Supple, symmetrical neck; trachea midline.  Lungs: Clear to auscultation bilaterally and normal respiratory effort.  Cardiovascular: Heart with regular rate and rhythm.    Extremities: No LE edema. Pulses 2+ and symmetric.   Abdomen: Abdomen is soft, non-tender non-distended with normal bowel sounds.  Skin: Skin color, texture,  turgor normal. No rashes.  Musculoskeletal: Normal gait.   Lymph Nodes: No cervical or supraclavicular adenopathy.  Neurologic: Normal strength and tone. No focal numbness or weakness.   Psychiatric: Normal affect. Alert.    Laboratory  Lab Results   Component Value Date    WBC 6.47 2021    HGB 12.5 2021    HCT 38.3 2021     2021    CHOL 195 2021    TRIG 154 (H) 2021    HDL 47 2021    ALT 11 2021    AST 13 2021     2021    K 3.8 2021     2021    CREATININE 0.6 2021    BUN 9 2021    CO2 27 2021    TSH 2.156 2021    HGBA1C 5.2 2021     ASSESSMENT & PLAN:     Annual physical exam  - Reviewed and updated past and current medical problems.  Discussed treatment of current medical problems    -     Lipid Panel; Future; Expected date: 2023  -     CBC Auto Differential; Future; Expected date: 2023  -     Comprehensive Metabolic Panel; Future; Expected date: 2023  -     TSH; Future; Expected date: 2023  -     Hemoglobin A1C; Future; Expected date: 2023  -     Vitamin D; Future; Expected date: 2023  -     Lipase; Future; Expected date: 2023    Mild intermittent chronic asthma   - Stable without any attacks in the past year.  -     albuterol (VENTOLIN HFA) 90 mcg/actuation inhaler; Inhale 2 puffs into the lungs every 6 (six) hours as needed for Wheezing.       Vitamin D deficiency  -    Was on replacement before. Not taking anything anymore.     History of H pylori infection  -     H. PYLORI ANTIBODY, IGG;21 Positive              2021              1. Take Pantoprazole 40 m tablet twice daily for 14 days.              2. Take Clarithromycin 500 m tablet twice daily for 14 days.              3. Take Metronidazole 500 m tablet three times daily for 14 days     -     US Abdomen 2021: Normal  -     H. pylori antigen stool negative: 2021    EGD  at [facility]:  3-      - Esophagus normal.  Mild gastritis.  Negative H pylori.    Today: Rx Pepcid 40 mg BID prn.     Family history of heart disease in male family member before age 55  - No chest pain symptoms.     Atopic neurodermatitis  Photosensitivity of skin  - Off all meds.     Fish allergy  -     EPINEPHrine (EPIPEN) 0.3 mg/0.3 mL AtIn; Inject 0.3 mLs (0.3 mg total) into the muscle once. for 1 dose     Preventive Health Maintenance:    Flu vaccine - refused.     Encounter for screening mammogram for breast cancer  -     Mammo Digital Screening Bilat; Future; Expected date: 01/18/2023    Return to Clinic for Follow Up with me:  1 Year.     Scheduled Follow-up :  No future appointments.      After Visit Medication List :     Medication List            Accurate as of January 18, 2023  2:30 PM. If you have any questions, ask your nurse or doctor.                START taking these medications      famotidine 40 MG tablet  Commonly known as: PEPCID  Take 1 tablet (40 mg total) by mouth 2 (two) times daily as needed (gastritis).  Started by: Guzman Salcedo MD            CONTINUE taking these medications      albuterol 90 mcg/actuation inhaler  Commonly known as: VENTOLIN HFA  Inhale 2 puffs into the lungs every 6 (six) hours as needed for Wheezing.     EPINEPHrine 0.3 mg/0.3 mL Atin  Commonly known as: EPIPEN  Inject 0.3 mLs (0.3 mg total) into the muscle once. for 1 dose            STOP taking these medications      ascorbic acid (vitamin C) 1000 MG tablet  Commonly known as: VITAMIN C  Stopped by: Guzman Salcedo MD     cholecalciferol (vitamin D3) 125 mcg (5,000 unit) Tab  Stopped by: Guzman Salcedo MD     triamcinolone acetonide 0.5% 0.5 % Crea  Commonly known as: KENALOG  Stopped by: Guzman Salcedo MD               Where to Get Your Medications        These medications were sent to [facility]  [address] [place] [address]  [facility] [facility] [address]      Phone: [phone]    famotidine 40 MG tablet         Signing Physician:  Guzman Salcedo MD

## 2023-01-23 ENCOUNTER — LAB VISIT (OUTPATIENT)
Dept: LAB | Facility: HOSPITAL | Age: 43
End: 2023-01-23
Attending: INTERNAL MEDICINE
Payer: COMMERCIAL

## 2023-01-23 DIAGNOSIS — Z00.00 ANNUAL PHYSICAL EXAM: ICD-10-CM

## 2023-01-23 DIAGNOSIS — E55.9 VITAMIN D DEFICIENCY: ICD-10-CM

## 2023-01-23 DIAGNOSIS — J45.20 MILD INTERMITTENT ASTHMA WITHOUT COMPLICATION: ICD-10-CM

## 2023-01-23 LAB
25(OH)D3+25(OH)D2 SERPL-MCNC: 36 NG/ML (ref 30–96)
ALBUMIN SERPL BCP-MCNC: 3.7 G/DL (ref 3.5–5.2)
ALP SERPL-CCNC: 55 U/L (ref 55–135)
ALT SERPL W/O P-5'-P-CCNC: 8 U/L (ref 10–44)
ANION GAP SERPL CALC-SCNC: 7 MMOL/L (ref 8–16)
AST SERPL-CCNC: 12 U/L (ref 10–40)
BASOPHILS # BLD AUTO: 0.03 K/UL (ref 0–0.2)
BASOPHILS NFR BLD: 0.5 % (ref 0–1.9)
BILIRUB SERPL-MCNC: 0.6 MG/DL (ref 0.1–1)
BUN SERPL-MCNC: 9 MG/DL (ref 6–20)
CALCIUM SERPL-MCNC: 9.3 MG/DL (ref 8.7–10.5)
CHLORIDE SERPL-SCNC: 107 MMOL/L (ref 95–110)
CHOLEST SERPL-MCNC: 170 MG/DL (ref 120–199)
CHOLEST/HDLC SERPL: 3.7 {RATIO} (ref 2–5)
CO2 SERPL-SCNC: 25 MMOL/L (ref 23–29)
CREAT SERPL-MCNC: 0.6 MG/DL (ref 0.5–1.4)
DIFFERENTIAL METHOD: ABNORMAL
EOSINOPHIL # BLD AUTO: 0.3 K/UL (ref 0–0.5)
EOSINOPHIL NFR BLD: 4.8 % (ref 0–8)
ERYTHROCYTE [DISTWIDTH] IN BLOOD BY AUTOMATED COUNT: 12.7 % (ref 11.5–14.5)
EST. GFR  (NO RACE VARIABLE): >60 ML/MIN/1.73 M^2
ESTIMATED AVG GLUCOSE: 105 MG/DL (ref 68–131)
GLUCOSE SERPL-MCNC: 90 MG/DL (ref 70–110)
HBA1C MFR BLD: 5.3 % (ref 4–5.6)
HCT VFR BLD AUTO: 37.5 % (ref 37–48.5)
HDLC SERPL-MCNC: 46 MG/DL (ref 40–75)
HDLC SERPL: 27.1 % (ref 20–50)
HGB BLD-MCNC: 11.9 G/DL (ref 12–16)
IMM GRANULOCYTES # BLD AUTO: 0.01 K/UL (ref 0–0.04)
IMM GRANULOCYTES NFR BLD AUTO: 0.2 % (ref 0–0.5)
LDLC SERPL CALC-MCNC: 105 MG/DL (ref 63–159)
LIPASE SERPL-CCNC: 22 U/L (ref 4–60)
LYMPHOCYTES # BLD AUTO: 1.6 K/UL (ref 1–4.8)
LYMPHOCYTES NFR BLD: 28.7 % (ref 18–48)
MCH RBC QN AUTO: 30.5 PG (ref 27–31)
MCHC RBC AUTO-ENTMCNC: 31.7 G/DL (ref 32–36)
MCV RBC AUTO: 96 FL (ref 82–98)
MONOCYTES # BLD AUTO: 0.5 K/UL (ref 0.3–1)
MONOCYTES NFR BLD: 8.2 % (ref 4–15)
NEUTROPHILS # BLD AUTO: 3.2 K/UL (ref 1.8–7.7)
NEUTROPHILS NFR BLD: 57.6 % (ref 38–73)
NONHDLC SERPL-MCNC: 124 MG/DL
NRBC BLD-RTO: 0 /100 WBC
PLATELET # BLD AUTO: 253 K/UL (ref 150–450)
PMV BLD AUTO: 11.4 FL (ref 9.2–12.9)
POTASSIUM SERPL-SCNC: 4.1 MMOL/L (ref 3.5–5.1)
PROT SERPL-MCNC: 7.3 G/DL (ref 6–8.4)
RBC # BLD AUTO: 3.9 M/UL (ref 4–5.4)
SODIUM SERPL-SCNC: 139 MMOL/L (ref 136–145)
TRIGL SERPL-MCNC: 95 MG/DL (ref 30–150)
TSH SERPL DL<=0.005 MIU/L-ACNC: 1.54 UIU/ML (ref 0.4–4)
WBC # BLD AUTO: 5.61 K/UL (ref 3.9–12.7)

## 2023-01-23 PROCEDURE — 82306 VITAMIN D 25 HYDROXY: CPT | Performed by: INTERNAL MEDICINE

## 2023-01-23 PROCEDURE — 36415 COLL VENOUS BLD VENIPUNCTURE: CPT | Performed by: INTERNAL MEDICINE

## 2023-01-23 PROCEDURE — 83690 ASSAY OF LIPASE: CPT | Performed by: INTERNAL MEDICINE

## 2023-01-23 PROCEDURE — 85025 COMPLETE CBC W/AUTO DIFF WBC: CPT | Performed by: INTERNAL MEDICINE

## 2023-01-23 PROCEDURE — 84443 ASSAY THYROID STIM HORMONE: CPT | Performed by: INTERNAL MEDICINE

## 2023-01-23 PROCEDURE — 80053 COMPREHEN METABOLIC PANEL: CPT | Performed by: INTERNAL MEDICINE

## 2023-01-23 PROCEDURE — 80061 LIPID PANEL: CPT | Performed by: INTERNAL MEDICINE

## 2023-01-23 PROCEDURE — 83036 HEMOGLOBIN GLYCOSYLATED A1C: CPT | Performed by: INTERNAL MEDICINE

## 2023-02-06 ENCOUNTER — APPOINTMENT (OUTPATIENT)
Dept: RADIOLOGY | Facility: OTHER | Age: 43
End: 2023-02-06
Attending: INTERNAL MEDICINE
Payer: COMMERCIAL

## 2023-02-06 VITALS — HEIGHT: 65 IN | WEIGHT: 125.88 LBS | BODY MASS INDEX: 20.97 KG/M2

## 2023-02-06 DIAGNOSIS — Z12.31 ENCOUNTER FOR SCREENING MAMMOGRAM FOR BREAST CANCER: ICD-10-CM

## 2023-02-06 PROCEDURE — 77067 SCR MAMMO BI INCL CAD: CPT | Mod: 26,,, | Performed by: INTERNAL MEDICINE

## 2023-02-06 PROCEDURE — 77063 MAMMO DIGITAL SCREENING BILAT WITH TOMO: ICD-10-PCS | Mod: 26,,, | Performed by: INTERNAL MEDICINE

## 2023-02-06 PROCEDURE — 77067 SCR MAMMO BI INCL CAD: CPT | Mod: TC,PN

## 2023-02-06 PROCEDURE — 77063 BREAST TOMOSYNTHESIS BI: CPT | Mod: 26,,, | Performed by: INTERNAL MEDICINE

## 2023-02-06 PROCEDURE — 77067 MAMMO DIGITAL SCREENING BILAT WITH TOMO: ICD-10-PCS | Mod: 26,,, | Performed by: INTERNAL MEDICINE

## 2023-03-02 ENCOUNTER — OFFICE VISIT (OUTPATIENT)
Dept: OPTOMETRY | Facility: CLINIC | Age: 43
End: 2023-03-02
Payer: COMMERCIAL

## 2023-03-02 DIAGNOSIS — H11.421 CHEMOSIS OF RIGHT CONJUNCTIVA: Primary | ICD-10-CM

## 2023-03-02 PROCEDURE — 92002 INTRM OPH EXAM NEW PATIENT: CPT | Mod: S$GLB,,, | Performed by: OPTOMETRIST

## 2023-03-02 PROCEDURE — 1159F PR MEDICATION LIST DOCUMENTED IN MEDICAL RECORD: ICD-10-PCS | Mod: CPTII,S$GLB,, | Performed by: OPTOMETRIST

## 2023-03-02 PROCEDURE — 3044F HG A1C LEVEL LT 7.0%: CPT | Mod: CPTII,S$GLB,, | Performed by: OPTOMETRIST

## 2023-03-02 PROCEDURE — 92002 PR EYE EXAM, NEW PATIENT,INTERMED: ICD-10-PCS | Mod: S$GLB,,, | Performed by: OPTOMETRIST

## 2023-03-02 PROCEDURE — 99999 PR PBB SHADOW E&M-EST. PATIENT-LVL II: CPT | Mod: PBBFAC,,, | Performed by: OPTOMETRIST

## 2023-03-02 PROCEDURE — 3044F PR MOST RECENT HEMOGLOBIN A1C LEVEL <7.0%: ICD-10-PCS | Mod: CPTII,S$GLB,, | Performed by: OPTOMETRIST

## 2023-03-02 PROCEDURE — 1159F MED LIST DOCD IN RCRD: CPT | Mod: CPTII,S$GLB,, | Performed by: OPTOMETRIST

## 2023-03-02 PROCEDURE — 99999 PR PBB SHADOW E&M-EST. PATIENT-LVL II: ICD-10-PCS | Mod: PBBFAC,,, | Performed by: OPTOMETRIST

## 2023-03-03 ENCOUNTER — TELEPHONE (OUTPATIENT)
Dept: OPHTHALMOLOGY | Facility: CLINIC | Age: 43
End: 2023-03-03
Payer: COMMERCIAL

## 2023-03-03 NOTE — PROGRESS NOTES
HPI    Urgent patient here today with complaints that the right eye has been   puffy for about a month states she sometimes feels like something in in   her eye  no pain in eye no blurred vision discharge redness or itchiness     No trauma   No sx   Gtts-refresh   Last edited by Kath Walters on 3/2/2023  9:30 AM.            Assessment /Plan     For exam results, see Encounter Report.    Chemosis of right conjunctiva    Conj swelling temp OD  [place] [facility]    Continue with PF art tears  Consult Dr. Alvarez for ? Surgical options?

## 2023-03-03 NOTE — TELEPHONE ENCOUNTER
----- Message from Cris Charles, OD sent at 3/3/2023  4:41 PM CST -----  Please call pt to schedule anterior seg eval with Dr. Alvarez

## 2023-03-07 ENCOUNTER — OFFICE VISIT (OUTPATIENT)
Dept: OPHTHALMOLOGY | Facility: CLINIC | Age: 43
End: 2023-03-07
Payer: COMMERCIAL

## 2023-03-07 DIAGNOSIS — H11.9 CONJUNCTIVAL LESION: Primary | ICD-10-CM

## 2023-03-07 PROCEDURE — 99999 PR PBB SHADOW E&M-EST. PATIENT-LVL I: ICD-10-PCS | Mod: PBBFAC,,, | Performed by: OPHTHALMOLOGY

## 2023-03-07 PROCEDURE — 99204 PR OFFICE/OUTPT VISIT, NEW, LEVL IV, 45-59 MIN: ICD-10-PCS | Mod: S$GLB,,, | Performed by: OPHTHALMOLOGY

## 2023-03-07 PROCEDURE — 92285 SLIT LAMP PHOTOGRAPHY - OU - BOTH EYES: ICD-10-PCS | Mod: S$GLB,,, | Performed by: OPHTHALMOLOGY

## 2023-03-07 PROCEDURE — 99204 OFFICE O/P NEW MOD 45 MIN: CPT | Mod: S$GLB,,, | Performed by: OPHTHALMOLOGY

## 2023-03-07 PROCEDURE — 92132 ANTERIOR SEGMENT OCT (OCULAR COHERENCE TOMOGRAPHY) - OU - BOTH EYES: ICD-10-PCS | Mod: S$GLB,,, | Performed by: OPHTHALMOLOGY

## 2023-03-07 PROCEDURE — 92285 EXTERNAL OCULAR PHOTOGRAPHY: CPT | Mod: S$GLB,,, | Performed by: OPHTHALMOLOGY

## 2023-03-07 PROCEDURE — 92132 CPTRZD OPH DX IMG ANT SGM: CPT | Mod: S$GLB,,, | Performed by: OPHTHALMOLOGY

## 2023-03-07 PROCEDURE — 3044F PR MOST RECENT HEMOGLOBIN A1C LEVEL <7.0%: ICD-10-PCS | Mod: CPTII,S$GLB,, | Performed by: OPHTHALMOLOGY

## 2023-03-07 PROCEDURE — 99999 PR PBB SHADOW E&M-EST. PATIENT-LVL I: CPT | Mod: PBBFAC,,, | Performed by: OPHTHALMOLOGY

## 2023-03-07 PROCEDURE — 3044F HG A1C LEVEL LT 7.0%: CPT | Mod: CPTII,S$GLB,, | Performed by: OPHTHALMOLOGY

## 2023-03-07 NOTE — PROGRESS NOTES
HPI    Melvin pt / irma / barros    Conj lesion OD    Patient states she has been having swelling in lateral corner of OD for   about 6 months. No pain or discomfort. No itching. Never goes down or gets   bigger. Using refresh daily with no improvement.   Last edited by Marivel Alvarez MD on 3/7/2023  5:40 PM.            Assessment /Plan     For exam results, see Encounter Report.    Conjunctival lesion  -     Slit Lamp Photography - OU - Both Eyes  -     Anterior Segment OCT - OU - Both Eyes      Temporal subconjunctival lesion OD  - mobile, smooth, non-cystic, non-tender    Photos and Ant seg OCT today --> will review images with Jenni and call pt back for recommendation.     Possible need for MRI imaging to r/out orbital involvement and then excisional / incisional bx in OR

## 2023-03-30 ENCOUNTER — TELEPHONE (OUTPATIENT)
Dept: OPHTHALMOLOGY | Facility: CLINIC | Age: 43
End: 2023-03-30
Payer: COMMERCIAL

## 2023-03-30 ENCOUNTER — PATIENT MESSAGE (OUTPATIENT)
Dept: OPHTHALMOLOGY | Facility: CLINIC | Age: 43
End: 2023-03-30
Payer: COMMERCIAL

## 2023-03-30 DIAGNOSIS — H11.9 CONJUNCTIVAL LESION: Primary | ICD-10-CM

## 2023-03-30 NOTE — TELEPHONE ENCOUNTER
----- Message from Marivel Alvarez MD sent at 3/29/2023  2:13 PM CDT -----  What does heydi usually do    ----- Message -----  From: Virginia John  Sent: 3/28/2023   8:04 AM CDT  To: Marivel Alvarez MD    With and without contrast or just without ok ?  ----- Message -----  From: Marivel Alvarez MD  Sent: 3/24/2023   9:56 PM CDT  To: Vivi Thomas MA, Virginia Lopez, #    Virginia I never asked jacque about this pt :/    Let's have her get a CT orbits and then we can review that together. Can you help scheduling?    Dx - conj lesion OD.    ----- Message -----  From: Virginia Lopez  Sent: 3/10/2023   3:18 PM CDT  To: Marivel Alvarez MD    Doc really is trying to steer away from orbital tumors.. I can ask him on Monday though   ----- Message -----  From: Marivel Alvarez MD  Sent: 3/7/2023   5:43 PM CST  To: Jacque Arteaga MD, Virginia Coleman - pls remind me to review this case with heydi and then we can call pt back with plan.

## 2023-04-17 ENCOUNTER — OFFICE VISIT (OUTPATIENT)
Dept: OBSTETRICS AND GYNECOLOGY | Facility: CLINIC | Age: 43
End: 2023-04-17
Payer: COMMERCIAL

## 2023-04-17 ENCOUNTER — HOSPITAL ENCOUNTER (OUTPATIENT)
Dept: RADIOLOGY | Facility: HOSPITAL | Age: 43
Discharge: HOME | End: 2023-04-17
Payer: COMMERCIAL

## 2023-04-17 VITALS — WEIGHT: 127.88 LBS | DIASTOLIC BLOOD PRESSURE: 58 MMHG | BODY MASS INDEX: 21.28 KG/M2 | SYSTOLIC BLOOD PRESSURE: 94 MMHG

## 2023-04-17 DIAGNOSIS — D25.9 UTERINE LEIOMYOMA, UNSPECIFIED LOCATION: ICD-10-CM

## 2023-04-17 DIAGNOSIS — D25.9 UTERINE LEIOMYOMA, UNSPECIFIED LOCATION: Primary | ICD-10-CM

## 2023-04-17 PROCEDURE — 3008F BODY MASS INDEX DOCD: CPT | Mod: CPTII,S$GLB,,

## 2023-04-17 PROCEDURE — 3044F HG A1C LEVEL LT 7.0%: CPT | Mod: CPTII,S$GLB,,

## 2023-04-17 PROCEDURE — 3008F PR BODY MASS INDEX (BMI) DOCUMENTED: ICD-10-PCS | Mod: CPTII,S$GLB,,

## 2023-04-17 PROCEDURE — 3044F PR MOST RECENT HEMOGLOBIN A1C LEVEL <7.0%: ICD-10-PCS | Mod: CPTII,S$GLB,,

## 2023-04-17 PROCEDURE — 3078F PR MOST RECENT DIASTOLIC BLOOD PRESSURE < 80 MM HG: ICD-10-PCS | Mod: CPTII,S$GLB,,

## 2023-04-17 PROCEDURE — 3074F PR MOST RECENT SYSTOLIC BLOOD PRESSURE < 130 MM HG: ICD-10-PCS | Mod: CPTII,S$GLB,,

## 2023-04-17 PROCEDURE — 3078F DIAST BP <80 MM HG: CPT | Mod: CPTII,S$GLB,,

## 2023-04-17 PROCEDURE — 76830 US PELVIS COMP WITH TRANSVAG NON-OB (XPD): ICD-10-PCS | Mod: 26,,, | Performed by: RADIOLOGY

## 2023-04-17 PROCEDURE — 99202 PR OFFICE/OUTPT VISIT, NEW, LEVL II, 15-29 MIN: ICD-10-PCS | Mod: S$GLB,,,

## 2023-04-17 PROCEDURE — 76830 TRANSVAGINAL US NON-OB: CPT | Mod: 26,,, | Performed by: RADIOLOGY

## 2023-04-17 PROCEDURE — 99999 PR PBB SHADOW E&M-EST. PATIENT-LVL III: CPT | Mod: PBBFAC,,,

## 2023-04-17 PROCEDURE — 99999 PR PBB SHADOW E&M-EST. PATIENT-LVL III: ICD-10-PCS | Mod: PBBFAC,,,

## 2023-04-17 PROCEDURE — 76856 US EXAM PELVIC COMPLETE: CPT | Mod: 26,,, | Performed by: RADIOLOGY

## 2023-04-17 PROCEDURE — 76856 US PELVIS COMP WITH TRANSVAG NON-OB (XPD): ICD-10-PCS | Mod: 26,,, | Performed by: RADIOLOGY

## 2023-04-17 PROCEDURE — 76856 US EXAM PELVIC COMPLETE: CPT | Mod: TC

## 2023-04-17 PROCEDURE — 1159F PR MEDICATION LIST DOCUMENTED IN MEDICAL RECORD: ICD-10-PCS | Mod: CPTII,S$GLB,,

## 2023-04-17 PROCEDURE — 3074F SYST BP LT 130 MM HG: CPT | Mod: CPTII,S$GLB,,

## 2023-04-17 PROCEDURE — 1159F MED LIST DOCD IN RCRD: CPT | Mod: CPTII,S$GLB,,

## 2023-04-17 PROCEDURE — 99202 OFFICE O/P NEW SF 15 MIN: CPT | Mod: S$GLB,,,

## 2023-04-17 RX ORDER — OMEPRAZOLE 40 MG/1
40 CAPSULE, DELAYED RELEASE ORAL
COMMUNITY
Start: 2023-03-29 | End: 2023-07-05

## 2023-04-17 NOTE — PROGRESS NOTES
CC: Fibroids    Compa Beard is a 42 y.o. female  presents with history of fibroids diagnosed at . She states she was told she had fibroids 2 years ago and that she needs monitoring by u/s. Menstrual cycle: monthly, duration= 3-5days, not heavy, denies unmanageable pain.     Patient states she has had a recent annual exam with PCP. She defers well woman exam today.  PAP: 2022  Mammogram: 2023    Past Medical History:   Diagnosis Date    Atopic neurodermatitis 07/10/2019    COVID-19 06/15/2022    Family history of heart disease in male family member before age 55 2019    Fish allergy 2021    Helicobacter pylori infection 2021 H. Pylori Triple Therapy: 1. Take Pantoprazole 40 m tablet twice daily for 14 days. 2. Take Clarithromycin 500 m tablet twice daily for 14 days. 3. Take Metronidazole 500 m tablet three times daily for 14 days.  Do not drink alcohol during this treatment.     Mild intermittent asthma without complication 2020    Photosensitivity of skin 07/10/2019    Vitamin D deficiency 2017       History reviewed. No pertinent surgical history.    OB History    Para Term  AB Living   1 1 1     1   SAB IAB Ectopic Multiple Live Births           1      # Outcome Date GA Lbr Darryl/2nd Weight Sex Delivery Anes PTL Lv   1 Term 04    F Vag-Spont   ALVAREZ       Family History   Problem Relation Age of Onset    No Known Problems Mother     Heart disease Father 55        CAD s/p stent    No Known Problems Sister     No Known Problems Brother     Breast cancer Neg Hx     Colon cancer Neg Hx     Ovarian cancer Neg Hx     Diabetes Neg Hx     Hypertension Neg Hx        Social History     Tobacco Use    Smoking status: Never    Smokeless tobacco: Never   Substance Use Topics    Alcohol use: No    Drug use: No       BP (!) 94/58   Wt 58 kg (127 lb 13.9 oz)   LMP 2023 (Approximate)   BMI 21.28 kg/m²     ROS:  GENERAL: Denies weight gain  or weight loss. Feeling well overall.   SKIN: Denies rash or lesions.   HEAD: Denies head injury or headache.   CHEST: Denies chest pain or shortness of breath.   ABDOMEN: No abdominal pain, constipation, diarrhea, nausea, vomiting or rectal bleeding.   URINARY: No frequency, dysuria, hematuria, or burning on urination.  REPRODUCTIVE: See HPI.   MUSCULOSKELETAL: Denies joint pain or swelling.   NEUROLOGIC: Denies syncope or weakness.     Physical Exam:  APPEARANCE: Well nourished, well developed, in no acute distress.  AFFECT: WNL, alert and oriented x 3  SKIN: No acne or hirsutism  NECK: Neck symmetric without masses or thyromegaly  CHEST: Good respiratory effect  PELVIC: declined  EXTREMITIES: No edema.    ASSESSMENT AND PLAN  1. Uterine leiomyoma, unspecified location  US Pelvis Comp with Transvag NON-OB (xpd        Counseled patient that normally no intervention is necessary for asymptomatic fibroids. Will order ultrasound and refer to GYN MD/DO as needed.     Counseled patient to rtc in 1 yr for annual well woman.    Pt confirmed understanding and all medical questions answered.

## 2023-05-03 ENCOUNTER — PATIENT MESSAGE (OUTPATIENT)
Dept: OPHTHALMOLOGY | Facility: CLINIC | Age: 43
End: 2023-05-03
Payer: COMMERCIAL

## 2023-05-03 DIAGNOSIS — H11.9 CONJUNCTIVAL LESION: Primary | ICD-10-CM

## 2023-07-05 ENCOUNTER — OFFICE VISIT (OUTPATIENT)
Dept: INTERNAL MEDICINE | Facility: CLINIC | Age: 43
End: 2023-07-05
Payer: COMMERCIAL

## 2023-07-05 VITALS
OXYGEN SATURATION: 98 % | RESPIRATION RATE: 16 BRPM | DIASTOLIC BLOOD PRESSURE: 62 MMHG | HEIGHT: 65 IN | WEIGHT: 129.88 LBS | HEART RATE: 72 BPM | SYSTOLIC BLOOD PRESSURE: 100 MMHG | BODY MASS INDEX: 21.64 KG/M2

## 2023-07-05 DIAGNOSIS — K29.50 OTHER CHRONIC GASTRITIS WITHOUT HEMORRHAGE: Primary | ICD-10-CM

## 2023-07-05 PROCEDURE — 3044F HG A1C LEVEL LT 7.0%: CPT | Mod: CPTII,S$GLB,, | Performed by: INTERNAL MEDICINE

## 2023-07-05 PROCEDURE — 99214 PR OFFICE/OUTPT VISIT, EST, LEVL IV, 30-39 MIN: ICD-10-PCS | Mod: S$GLB,,, | Performed by: INTERNAL MEDICINE

## 2023-07-05 PROCEDURE — 99214 OFFICE O/P EST MOD 30 MIN: CPT | Mod: S$GLB,,, | Performed by: INTERNAL MEDICINE

## 2023-07-05 PROCEDURE — 3078F DIAST BP <80 MM HG: CPT | Mod: CPTII,S$GLB,, | Performed by: INTERNAL MEDICINE

## 2023-07-05 PROCEDURE — 3044F PR MOST RECENT HEMOGLOBIN A1C LEVEL <7.0%: ICD-10-PCS | Mod: CPTII,S$GLB,, | Performed by: INTERNAL MEDICINE

## 2023-07-05 PROCEDURE — 1159F PR MEDICATION LIST DOCUMENTED IN MEDICAL RECORD: ICD-10-PCS | Mod: CPTII,S$GLB,, | Performed by: INTERNAL MEDICINE

## 2023-07-05 PROCEDURE — 3074F PR MOST RECENT SYSTOLIC BLOOD PRESSURE < 130 MM HG: ICD-10-PCS | Mod: CPTII,S$GLB,, | Performed by: INTERNAL MEDICINE

## 2023-07-05 PROCEDURE — 3008F BODY MASS INDEX DOCD: CPT | Mod: CPTII,S$GLB,, | Performed by: INTERNAL MEDICINE

## 2023-07-05 PROCEDURE — 3074F SYST BP LT 130 MM HG: CPT | Mod: CPTII,S$GLB,, | Performed by: INTERNAL MEDICINE

## 2023-07-05 PROCEDURE — 99999 PR PBB SHADOW E&M-EST. PATIENT-LVL III: ICD-10-PCS | Mod: PBBFAC,,, | Performed by: INTERNAL MEDICINE

## 2023-07-05 PROCEDURE — 3008F PR BODY MASS INDEX (BMI) DOCUMENTED: ICD-10-PCS | Mod: CPTII,S$GLB,, | Performed by: INTERNAL MEDICINE

## 2023-07-05 PROCEDURE — 3078F PR MOST RECENT DIASTOLIC BLOOD PRESSURE < 80 MM HG: ICD-10-PCS | Mod: CPTII,S$GLB,, | Performed by: INTERNAL MEDICINE

## 2023-07-05 PROCEDURE — 99999 PR PBB SHADOW E&M-EST. PATIENT-LVL III: CPT | Mod: PBBFAC,,, | Performed by: INTERNAL MEDICINE

## 2023-07-05 PROCEDURE — 1159F MED LIST DOCD IN RCRD: CPT | Mod: CPTII,S$GLB,, | Performed by: INTERNAL MEDICINE

## 2023-07-05 RX ORDER — HYOSCYAMINE SULFATE 0.12 MG/1
0.12 TABLET SUBLINGUAL EVERY 4 HOURS PRN
Qty: 90 TABLET | Refills: 11 | Status: SHIPPED | OUTPATIENT
Start: 2023-07-05 | End: 2023-12-29

## 2023-07-05 RX ORDER — SUCRALFATE 1 G/1
1 TABLET ORAL 3 TIMES DAILY
Qty: 270 TABLET | Refills: 3 | Status: SHIPPED | OUTPATIENT
Start: 2023-07-05 | End: 2023-12-29

## 2023-07-05 NOTE — PROGRESS NOTES
INTERNAL MEDICINE CLINIC  Follow-up Visit Progress Note     PRESENTING HISTORY     PCP: Guzman Salcedo MD    Last Clinic Visit with me:  23    Current Chief Complaint/Problem:    Chief Complaint   Patient presents with    Follow-up     History of Present Illness & ROS: Ms. Compa Beard is a 43 y.o. female.    RUQ pain again.  No bleeding.  She has not been taking PPI.    PAST HISTORY:     Past Medical History:   Diagnosis Date    Atopic neurodermatitis 07/10/2019    COVID-19 06/15/2022    Family history of heart disease in male family member before age 55 2019    Fish allergy 2021    Helicobacter pylori infection 2021 H. Pylori Triple Therapy: 1. Take Pantoprazole 40 m tablet twice daily for 14 days. 2. Take Clarithromycin 500 m tablet twice daily for 14 days. 3. Take Metronidazole 500 m tablet three times daily for 14 days.  Do not drink alcohol during this treatment.     Mild intermittent asthma without complication 2020    Photosensitivity of skin 07/10/2019    Vitamin D deficiency 2017       No past surgical history on file.    Family History   Problem Relation Age of Onset    No Known Problems Mother     Heart disease Father 55        CAD s/p stent    No Known Problems Sister     No Known Problems Brother     Breast cancer Neg Hx     Colon cancer Neg Hx     Ovarian cancer Neg Hx     Diabetes Neg Hx     Hypertension Neg Hx        Social History     Socioeconomic History    Marital status:     Number of children: 1   Occupational History    Occupation: sales   Tobacco Use    Smoking status: Never    Smokeless tobacco: Never   Substance and Sexual Activity    Alcohol use: No    Drug use: No    Sexual activity: Yes     Partners: Male     Birth control/protection: None, Condom     Comment:  since    Social History Narrative    She sales items on boats.    : 1 child.    Lived in [place] for 7 yrs now (as of )       MEDICATIONS &  ALLERGIES:     Current Outpatient Medications on File Prior to Visit   Medication Sig Dispense Refill    [DISCONTINUED] omeprazole (PRILOSEC) 40 MG capsule Take 40 mg by mouth.      albuterol (VENTOLIN HFA) 90 mcg/actuation inhaler Inhale 2 puffs into the lungs every 6 (six) hours as needed for Wheezing. (Patient not taking: Reported on 1/18/2023) 18 g 11    EPINEPHrine (EPIPEN) 0.3 mg/0.3 mL AtIn Inject 0.3 mLs (0.3 mg total) into the muscle once. for 1 dose 0.3 mL 0    [DISCONTINUED] famotidine (PEPCID) 40 MG tablet Take 1 tablet (40 mg total) by mouth 2 (two) times daily as needed (gastritis). (Patient not taking: Reported on 3/2/2023) 60 tablet 11     No current facility-administered medications on file prior to visit.        Review of patient's allergies indicates:   Allergen Reactions    Fish containing products Swelling     Facial swelling (lips, eyes, all areas)       Medications Reconciliation:   I have reconciled the patient's home medications and discharge medications with the patient/family. I have updated all changes.  Refer to After-Visit Medication List.    OBJECTIVE:     Vital Signs:  Vitals:    07/05/23 1305   BP: 100/62   Pulse: 72   Resp: 16     Wt Readings from Last 3 Encounters:   07/05/23 1305 58.9 kg (129 lb 13.6 oz)   04/17/23 1331 58 kg (127 lb 13.9 oz)   02/06/23 1400 57.1 kg (125 lb 14.1 oz)     Body mass index is 21.61 kg/m².     Physical Exam:  General: Well developed, well nourished. No distress.  HEENT: Head is normocephalic, atraumatic  Eyes: Clear conjunctiva.  Neck: Supple, symmetrical neck; trachea midline.  Lungs: Clear to auscultation bilaterally and normal respiratory effort.  Cardiovascular: Heart with regular rate and rhythm.    Extremities: No LE edema.  Abdomen: normal bowel sounds.  Skin: Skin color, texture, turgor normal. No rashes.  Musculoskeletal: Normal gait.   Psychiatric: Normal affect. Alert.    Laboratory  Lab Results   Component Value Date    WBC 5.61 01/23/2023     HGB 11.9 (L) 2023    HCT 37.5 2023     2023    CHOL 170 2023    TRIG 95 2023    HDL 46 2023    ALT 8 (L) 2023    AST 12 2023     2023    K 4.1 2023     2023    CREATININE 0.6 2023    BUN 9 2023    CO2 25 2023    TSH 1.544 2023    HGBA1C 5.3 2023       ASSESSMENT & PLAN:       Mild intermittent chronic asthma   - Stable without any attacks in the past year.  -     albuterol (VENTOLIN HFA) 90 mcg/actuation inhaler; Inhale 2 puffs into the lungs every 6 (six) hours as needed for Wheezing.       Vitamin D deficiency  -    Was on replacement before. Not taking anything anymore.     History of H pylori infection  Chronic gastritis  -     H. PYLORI ANTIBODY, IGG;21 Positive              2021              1. Take Pantoprazole 40 m tablet twice daily for 14 days.              2. Take Clarithromycin 500 m tablet twice daily for 14 days.              3. Take Metronidazole 500 m tablet three times daily for 14 days     -     US Abdomen 2021: Normal  -     H. pylori antigen stool negative: 2021      EGD at [facility]:  3-      - Esophagus normal.  Mild gastritis.  Negative H pylori.  Final Diagnosis     Small intestine, biopsy  - Small bowel mucosa with unremarkable histology.     2. Stomach, biopsy  - Mild chronic gastritis.  - Intestinal metaplasia (complete).  - Negative for H.pylori-like organisms on H&E stain.     Rx:  -     sucralfate (CARAFATE) 1 gram tablet; Take 1 tablet (1 g total) by mouth 3 (three) times daily.  Dispense: 270 tablet; Refill: 3  -     hyoscyamine (LEVSIN/SL) 0.125 mg Subl; Place 1 tablet (0.125 mg total) under the tongue every 4 (four) hours as needed (abdominal bloating).  Dispense: 90 tablet; Refill: 11      Family history of heart disease in male family member before age 55  - No chest pain symptoms.     Atopic  neurodermatitis  Photosensitivity of skin  - Off all meds.     Fish allergy  -     EPINEPHrine (EPIPEN) 0.3 mg/0.3 mL AtIn; Inject 0.3 mLs (0.3 mg total) into the muscle once. for 1 dose     Preventive Health Maintenance:     Flu vaccine - refused.      Return to Clinic for Follow Up with me:  Jan 2024    Scheduled Follow-up :  No future appointments.      After Visit Medication List :     Medication List            Accurate as of July 5, 2023  1:25 PM. If you have any questions, ask your nurse or doctor.                START taking these medications      hyoscyamine 0.125 mg Subl  Commonly known as: LEVSIN/SL  Place 1 tablet (0.125 mg total) under the tongue every 4 (four) hours as needed (abdominal bloating).  Started by: Guzman Salcedo MD     sucralfate 1 gram tablet  Commonly known as: CARAFATE  Take 1 tablet (1 g total) by mouth 3 (three) times daily.  Started by: Guzman Salcedo MD            CONTINUE taking these medications      albuterol 90 mcg/actuation inhaler  Commonly known as: VENTOLIN HFA  Inhale 2 puffs into the lungs every 6 (six) hours as needed for Wheezing.     EPINEPHrine 0.3 mg/0.3 mL Atin  Commonly known as: EPIPEN  Inject 0.3 mLs (0.3 mg total) into the muscle once. for 1 dose            STOP taking these medications      famotidine 40 MG tablet  Commonly known as: PEPCID  Stopped by: Guzman Salcedo MD     omeprazole 40 MG capsule  Commonly known as: PRILOSEC  Stopped by: Guzman Salcedo MD               Where to Get Your Medications        These medications were sent to [facility] [address] [place] [address]  [address] [place] [id number]      Phone: [phone]   hyoscyamine 0.125 mg Subl  sucralfate 1 gram tablet         Signing Physician:  Guzman Salcedo MD

## 2023-12-29 ENCOUNTER — OFFICE VISIT (OUTPATIENT)
Dept: INTERNAL MEDICINE | Facility: CLINIC | Age: 43
End: 2023-12-29
Payer: COMMERCIAL

## 2023-12-29 VITALS
WEIGHT: 128.31 LBS | HEIGHT: 65 IN | BODY MASS INDEX: 21.38 KG/M2 | SYSTOLIC BLOOD PRESSURE: 100 MMHG | DIASTOLIC BLOOD PRESSURE: 60 MMHG | HEART RATE: 74 BPM | OXYGEN SATURATION: 98 %

## 2023-12-29 DIAGNOSIS — E55.9 VITAMIN D DEFICIENCY: ICD-10-CM

## 2023-12-29 DIAGNOSIS — J45.20 MILD INTERMITTENT ASTHMA WITHOUT COMPLICATION: ICD-10-CM

## 2023-12-29 DIAGNOSIS — Z12.31 ENCOUNTER FOR SCREENING MAMMOGRAM FOR BREAST CANCER: ICD-10-CM

## 2023-12-29 DIAGNOSIS — Z00.00 ROUTINE GENERAL MEDICAL EXAMINATION AT A HEALTH CARE FACILITY: Primary | ICD-10-CM

## 2023-12-29 DIAGNOSIS — K29.50 OTHER CHRONIC GASTRITIS WITHOUT HEMORRHAGE: ICD-10-CM

## 2023-12-29 DIAGNOSIS — L20.81 ATOPIC NEURODERMATITIS: ICD-10-CM

## 2023-12-29 DIAGNOSIS — L56.8 PHOTOSENSITIVITY OF SKIN: ICD-10-CM

## 2023-12-29 DIAGNOSIS — Z86.19 HISTORY OF HELICOBACTER INFECTION: ICD-10-CM

## 2023-12-29 DIAGNOSIS — D25.9 UTERINE LEIOMYOMA, UNSPECIFIED LOCATION: ICD-10-CM

## 2023-12-29 DIAGNOSIS — Z82.49 FAMILY HISTORY OF HEART DISEASE IN MALE FAMILY MEMBER BEFORE AGE 55: ICD-10-CM

## 2023-12-29 DIAGNOSIS — Z91.013 FISH ALLERGY: ICD-10-CM

## 2023-12-29 PROCEDURE — 1159F MED LIST DOCD IN RCRD: CPT | Mod: CPTII,S$GLB,, | Performed by: INTERNAL MEDICINE

## 2023-12-29 PROCEDURE — 99214 OFFICE O/P EST MOD 30 MIN: CPT | Mod: S$GLB,,, | Performed by: INTERNAL MEDICINE

## 2023-12-29 PROCEDURE — 3008F PR BODY MASS INDEX (BMI) DOCUMENTED: ICD-10-PCS | Mod: CPTII,S$GLB,, | Performed by: INTERNAL MEDICINE

## 2023-12-29 PROCEDURE — 3044F HG A1C LEVEL LT 7.0%: CPT | Mod: CPTII,S$GLB,, | Performed by: INTERNAL MEDICINE

## 2023-12-29 PROCEDURE — 3078F DIAST BP <80 MM HG: CPT | Mod: CPTII,S$GLB,, | Performed by: INTERNAL MEDICINE

## 2023-12-29 PROCEDURE — 3074F PR MOST RECENT SYSTOLIC BLOOD PRESSURE < 130 MM HG: ICD-10-PCS | Mod: CPTII,S$GLB,, | Performed by: INTERNAL MEDICINE

## 2023-12-29 PROCEDURE — 3074F SYST BP LT 130 MM HG: CPT | Mod: CPTII,S$GLB,, | Performed by: INTERNAL MEDICINE

## 2023-12-29 PROCEDURE — 99999 PR PBB SHADOW E&M-EST. PATIENT-LVL IV: CPT | Mod: PBBFAC,,, | Performed by: INTERNAL MEDICINE

## 2023-12-29 PROCEDURE — 3078F PR MOST RECENT DIASTOLIC BLOOD PRESSURE < 80 MM HG: ICD-10-PCS | Mod: CPTII,S$GLB,, | Performed by: INTERNAL MEDICINE

## 2023-12-29 PROCEDURE — 99214 PR OFFICE/OUTPT VISIT, EST, LEVL IV, 30-39 MIN: ICD-10-PCS | Mod: S$GLB,,, | Performed by: INTERNAL MEDICINE

## 2023-12-29 PROCEDURE — 99999 PR PBB SHADOW E&M-EST. PATIENT-LVL IV: ICD-10-PCS | Mod: PBBFAC,,, | Performed by: INTERNAL MEDICINE

## 2023-12-29 PROCEDURE — 3008F BODY MASS INDEX DOCD: CPT | Mod: CPTII,S$GLB,, | Performed by: INTERNAL MEDICINE

## 2023-12-29 PROCEDURE — 1159F PR MEDICATION LIST DOCUMENTED IN MEDICAL RECORD: ICD-10-PCS | Mod: CPTII,S$GLB,, | Performed by: INTERNAL MEDICINE

## 2023-12-29 PROCEDURE — 3044F PR MOST RECENT HEMOGLOBIN A1C LEVEL <7.0%: ICD-10-PCS | Mod: CPTII,S$GLB,, | Performed by: INTERNAL MEDICINE

## 2023-12-29 NOTE — PROGRESS NOTES
INTERNAL MEDICINE CLINIC  Follow-up Visit Progress Note     PRESENTING HISTORY     PCP: Guzman Salcedo MD    Last Clinic Visit with me: 2023 Annual    Current Chief Complaint/Problem:    Chief Complaint   Patient presents with    Follow up      History of Present Illness & ROS: Ms. Compa Beard is a 43 y.o. female.    Had a bad cough few weeks ago.     Review of Systems:  Answers submitted by the patient for this visit:  Review of Systems Questionnaire (Submitted on 2023)  activity change: No  unexpected weight change: No  neck pain: No  hearing loss: No  rhinorrhea: No  trouble swallowing: No  eye discharge: No  visual disturbance: No  chest tightness: No  wheezing: No  chest pain: No  palpitations: No  blood in stool: No  constipation: No  vomiting: No  diarrhea: No  polydipsia: No  polyuria: No  difficulty urinating: No  hematuria: No  menstrual problem: No  dysuria: No  joint swelling: No  arthralgias: No  headaches: No  weakness: No  confusion: No  dysphoric mood: No        PAST HISTORY:     Past Medical History:   Diagnosis Date    Atopic neurodermatitis 07/10/2019    COVID-19 06/15/2022    Family history of heart disease in male family member before age 55 2019    Fish allergy 2021    Helicobacter pylori infection 2021 H. Pylori Triple Therapy: 1. Take Pantoprazole 40 m tablet twice daily for 14 days. 2. Take Clarithromycin 500 m tablet twice daily for 14 days. 3. Take Metronidazole 500 m tablet three times daily for 14 days.  Do not drink alcohol during this treatment.     Mild intermittent asthma without complication 2020    Photosensitivity of skin 07/10/2019    Vitamin D deficiency 2017       No past surgical history on file.    Family History   Problem Relation Age of Onset    No Known Problems Mother     Heart disease Father 55        CAD s/p stent    No Known Problems Sister     No Known Problems Brother     Breast cancer Neg Hx     Colon  cancer Neg Hx     Ovarian cancer Neg Hx     Diabetes Neg Hx     Hypertension Neg Hx        Social History     Socioeconomic History    Marital status:     Number of children: 1   Occupational History    Occupation: sales   Tobacco Use    Smoking status: Never    Smokeless tobacco: Never   Substance and Sexual Activity    Alcohol use: No    Drug use: No    Sexual activity: Yes     Partners: Male     Birth control/protection: None, Condom     Comment:  since 2003   Social History Narrative    She sales items on boats.    : 1 child.    Lived in [place] for 7 yrs now (as of 2020)       MEDICATIONS & ALLERGIES:     Current Outpatient Medications on File Prior to Visit   Medication Sig Dispense Refill    albuterol (VENTOLIN HFA) 90 mcg/actuation inhaler Inhale 2 puffs into the lungs every 6 (six) hours as needed for Wheezing. 18 g 11                  EPINEPHrine (EPIPEN) 0.3 mg/0.3 mL AtIn Inject 0.3 mLs (0.3 mg total) into the muscle once. for 1 dose 0.3 mL 0     No current facility-administered medications on file prior to visit.        Review of patient's allergies indicates:   Allergen Reactions    Fish containing products Swelling     Facial swelling (lips, eyes, all areas)       Medications Reconciliation:   I have reconciled the patient's home medications and discharge medications with the patient/family. I have updated all changes.  Refer to After-Visit Medication List.    OBJECTIVE:     Vital Signs:  Vitals:    12/29/23 1559   BP: 100/60   Pulse: 74     Wt Readings from Last 3 Encounters:   12/29/23 1559 58.2 kg (128 lb 4.9 oz)   07/05/23 1305 58.9 kg (129 lb 13.6 oz)   04/17/23 1331 58 kg (127 lb 13.9 oz)     Body mass index is 21.35 kg/m².     Physical Exam:  General: Well developed, well nourished. No distress.  HEENT: Head is normocephalic, atraumatic; ears are normal.    Eyes: Clear conjunctiva.  Neck: Supple, symmetrical neck; trachea midline.  Lungs: Clear to auscultation  bilaterally and normal respiratory effort.  Cardiovascular: Heart with regular rate and rhythm.    Extremities: No LE edema. Pulses 2+ and symmetric.   Abdomen: Abdomen is soft, non-tender non-distended with normal bowel sounds.  Skin: Skin color, texture, turgor normal. No rashes.  Musculoskeletal: Normal gait.   Lymph Nodes: No cervical or supraclavicular adenopathy.  Neurologic: Normal strength and tone. No focal numbness or weakness.   Psychiatric: Normal affect. Alert.    Laboratory  Lab Results   Component Value Date    WBC 5.61 01/23/2023    HGB 11.9 (L) 01/23/2023    HCT 37.5 01/23/2023     01/23/2023    CHOL 170 01/23/2023    TRIG 95 01/23/2023    HDL 46 01/23/2023    ALT 8 (L) 01/23/2023    AST 12 01/23/2023     01/23/2023    K 4.1 01/23/2023     01/23/2023    CREATININE 0.6 01/23/2023    BUN 9 01/23/2023    CO2 25 01/23/2023    TSH 1.544 01/23/2023    HGBA1C 5.3 01/23/2023       ASSESSMENT & PLAN:     Routine general medical examination at a health care facility  - Reviewed and updated past and current medical problems.  Discussed treatment of current medical problems    -     Lipid Panel; Future; Expected date: 12/29/2023  -     CBC Auto Differential; Future; Expected date: 12/29/2023  -     Comprehensive Metabolic Panel; Future; Expected date: 12/29/2023  -     TSH; Future; Expected date: 12/29/2023  -     Hemoglobin A1C; Future; Expected date: 12/29/2023  -     Vitamin D; Future; Expected date: 06/26/2024  -     Mammo Digital Screening Bilat; Future; Expected date: 02/06/2024  -     Vitamin B12; Future; Expected date: 12/29/2023  -     QUANTIFERON GOLD TB; Future; Expected date: 12/29/2023  -     Urinalysis, Reflex to Urine Culture Urine, Clean Catch; Future; Expected date: 01/05/2024  -     C. trachomatis/N. gonorrhoeae by AMP DNA; Future; Expected date: 01/05/2024    -     Ambulatory referral/consult to Gynecology; Future; Expected date: 01/29/2024    Mild intermittent chronic  asthma   - Stable without any attacks in the past year.  -     albuterol (VENTOLIN HFA) 90 mcg/actuation inhaler; Inhale 2 puffs into the lungs every 6 (six) hours as needed for Wheezing.       Vitamin D deficiency  -    Was on replacement before. Not taking anything anymore.     History of H pylori infection  Chronic gastritis without bleeding  -     H. PYLORI ANTIBODY, IGG;21 Positive              2021              1. Take Pantoprazole 40 m tablet twice daily for 14 days.              2. Take Clarithromycin 500 m tablet twice daily for 14 days.              3. Take Metronidazole 500 m tablet three times daily for 14 days     -     US Abdomen 2021: Normal  -     H. pylori antigen stool negative: 2021     EGD at [facility]:  3-      - Esophagus normal.  Mild gastritis.  Negative H pylori.     - Doing well. Not on any meds.     Family history of heart disease in male family member before age 55  - No chest pain symptoms.     Atopic neurodermatitis  Photosensitivity of skin  - Off all meds.     Fish allergy  -     EPINEPHrine (EPIPEN) 0.3 mg/0.3 mL AtIn; Inject 0.3 mLs (0.3 mg total) into the muscle once. for 1 dose     Uterine leiomyoma, unspecified location    The uterus measures 8.7 x 5.1 x 6.6 cm, the endometrium 13 mm.  The right ovary measures 3.6 x 3.5 x 2.0 cm, resistive index 0.58.  Left ovary not seen due to bowel gas pattern there is a right ovarian cyst measuring 1.9 cm.  There are 2 fibroids measuring 2.9 and 2.5 cm respectively.       Imp: Two uterine leiomyomas and a physiologic right ovarian cyst.    Preventive Health Maintenance:     Flu vaccine - refused.     Encounter for screening mammogram for breast cancer  -     Mammo Digital Screening Bilat; Future; Expected date: 2024    Return to Clinic for Follow Up with me:  1 Year.    Scheduled Follow-up :  No future appointments.      After Visit Medication List :     Medication List            Accurate  as of December 29, 2023  4:24 PM. If you have any questions, ask your nurse or doctor.                CONTINUE taking these medications      albuterol 90 mcg/actuation inhaler  Commonly known as: VENTOLIN HFA  Inhale 2 puffs into the lungs every 6 (six) hours as needed for Wheezing.     EPINEPHrine 0.3 mg/0.3 mL Atin  Commonly known as: EPIPEN  Inject 0.3 mLs (0.3 mg total) into the muscle once. for 1 dose            STOP taking these medications      hyoscyamine 0.125 mg Subl  Commonly known as: LEVSIN/SL  Stopped by: Guzman Salcedo MD     sucralfate 1 gram tablet  Commonly known as: CARAFATE  Stopped by: Guzman Salcedo MD              Signing Physician:  Guzman Salcedo MD

## 2024-01-02 ENCOUNTER — LAB VISIT (OUTPATIENT)
Dept: LAB | Facility: HOSPITAL | Age: 44
End: 2024-01-02
Attending: INTERNAL MEDICINE
Payer: COMMERCIAL

## 2024-01-02 DIAGNOSIS — Z00.00 ROUTINE GENERAL MEDICAL EXAMINATION AT A HEALTH CARE FACILITY: ICD-10-CM

## 2024-01-02 DIAGNOSIS — J45.20 MILD INTERMITTENT ASTHMA WITHOUT COMPLICATION: ICD-10-CM

## 2024-01-02 PROCEDURE — 86480 TB TEST CELL IMMUN MEASURE: CPT | Performed by: INTERNAL MEDICINE

## 2024-01-03 LAB
GAMMA INTERFERON BACKGROUND BLD IA-ACNC: 0.21 IU/ML
M TB IFN-G CD4+ BCKGRND COR BLD-ACNC: -0.02 IU/ML
M TB IFN-G CD4+ BCKGRND COR BLD-ACNC: -0.04 IU/ML
MITOGEN IGNF BCKGRD COR BLD-ACNC: 9.79 IU/ML
TB GOLD PLUS: NEGATIVE

## 2024-01-18 ENCOUNTER — OFFICE VISIT (OUTPATIENT)
Dept: OBSTETRICS AND GYNECOLOGY | Facility: CLINIC | Age: 44
End: 2024-01-18
Payer: COMMERCIAL

## 2024-01-18 VITALS
WEIGHT: 127.63 LBS | DIASTOLIC BLOOD PRESSURE: 70 MMHG | BODY MASS INDEX: 21.24 KG/M2 | SYSTOLIC BLOOD PRESSURE: 105 MMHG

## 2024-01-18 DIAGNOSIS — D25.9 UTERINE LEIOMYOMA, UNSPECIFIED LOCATION: Primary | ICD-10-CM

## 2024-01-18 PROCEDURE — 3008F BODY MASS INDEX DOCD: CPT | Mod: CPTII,S$GLB,,

## 2024-01-18 PROCEDURE — 3044F HG A1C LEVEL LT 7.0%: CPT | Mod: CPTII,S$GLB,,

## 2024-01-18 PROCEDURE — 3074F SYST BP LT 130 MM HG: CPT | Mod: CPTII,S$GLB,,

## 2024-01-18 PROCEDURE — 1159F MED LIST DOCD IN RCRD: CPT | Mod: CPTII,S$GLB,,

## 2024-01-18 PROCEDURE — 1160F RVW MEDS BY RX/DR IN RCRD: CPT | Mod: CPTII,S$GLB,,

## 2024-01-18 PROCEDURE — 99999 PR PBB SHADOW E&M-EST. PATIENT-LVL III: CPT | Mod: PBBFAC,,,

## 2024-01-18 PROCEDURE — 99213 OFFICE O/P EST LOW 20 MIN: CPT | Mod: S$GLB,,,

## 2024-01-18 PROCEDURE — 3078F DIAST BP <80 MM HG: CPT | Mod: CPTII,S$GLB,,

## 2024-01-18 NOTE — PROGRESS NOTES
Compa Beard is a 43 y.o. female  presents for follow up of fibroids.  She complains of persistent right sided pelvic pain and hip pain x2 months, worse when walking. Menstrual cycle is monthly, duration= 3-5days, not heavy. Denies constipation, dysuria.     Review of pelvic US 2023:  EXAMINATION:  US PELVIS COMP WITH TRANSVAG NON-OB (XPD)     CLINICAL HISTORY:  Leiomyoma of uterus, unspecified     FINDINGS:  The uterus measures 8.7 x 5.1 x 6.6 cm, the endometrium 13 mm.     The right ovary measures 3.6 x 3.5 x 2.0 cm, resistive index 0.58.     Left ovary not seen due to bowel gas pattern there is a right ovarian cyst measuring 1.9 cm.  There are 2 fibroids measuring 2.9 and 2.5 cm respectively.     Impression:  Two uterine leiomyomas and a physiologic right ovarian cyst.    Past Medical History:   Diagnosis Date    Atopic neurodermatitis 07/10/2019    COVID-19 06/15/2022    Family history of heart disease in male family member before age 55 2019    Fish allergy 2021    Helicobacter pylori infection 2021 H. Pylori Triple Therapy: 1. Take Pantoprazole 40 m tablet twice daily for 14 days. 2. Take Clarithromycin 500 m tablet twice daily for 14 days. 3. Take Metronidazole 500 m tablet three times daily for 14 days.  Do not drink alcohol during this treatment.     Mild intermittent asthma without complication 2020    Photosensitivity of skin 07/10/2019    Vitamin D deficiency 2017     History reviewed. No pertinent surgical history.  Social History     Tobacco Use    Smoking status: Never    Smokeless tobacco: Never   Substance Use Topics    Alcohol use: No    Drug use: No     OB History    Para Term  AB Living   1 1 1     1   SAB IAB Ectopic Multiple Live Births           1      # Outcome Date GA Lbr Darryl/2nd Weight Sex Delivery Anes PTL Lv   1 Term 04    F Vag-Spont   ALVAREZ       /70   Wt 57.9 kg (127 lb 10.3 oz)   LMP 2024   " BMI 21.24 kg/m²     ROS:  GENERAL: No fever, chills, fatigability or weight loss.  ABDOMEN: No abdominal pain. Denies nausea or vomiting. No diarrhea or constipation  URINARY: No frequency, dysuria, hematuria.  VULVOVAGINAL: See HPI.  NEUROLOGICAL: No headaches. No vision changes.    PHYSICAL EXAM:  APPEARANCE: Well nourished, well developed, in no acute distress.  AFFECT: WNL, alert and oriented x 3  SKIN: No acne or hirsutism  CHEST: Good respiratory effect  ABDOMEN: Soft.  No tenderness or masses. Negative psoas, obturator.  PELVIC: Deferred  EXTREMITIES: No edema.    ASSESSMENT and PLAN:  1. Uterine leiomyoma, unspecified location  US Pelvis Comp with Transvag NON-OB (xpd        Reassuring physical exam. Discussed fibroids can cause symptoms such as pain, pressure, or a feeling of "fullness" in the belly. Repeat US for further evaluation and follow up pending results.    Patient confirms understanding and all medical questions answered.      "

## 2024-01-29 ENCOUNTER — HOSPITAL ENCOUNTER (OUTPATIENT)
Dept: RADIOLOGY | Facility: HOSPITAL | Age: 44
Discharge: HOME | End: 2024-01-29
Payer: COMMERCIAL

## 2024-01-29 DIAGNOSIS — D25.9 UTERINE LEIOMYOMA, UNSPECIFIED LOCATION: ICD-10-CM

## 2024-01-29 PROCEDURE — 76830 TRANSVAGINAL US NON-OB: CPT | Mod: TC

## 2024-01-29 PROCEDURE — 76856 US EXAM PELVIC COMPLETE: CPT | Mod: 26,,, | Performed by: STUDENT IN AN ORGANIZED HEALTH CARE EDUCATION/TRAINING PROGRAM

## 2024-01-29 PROCEDURE — 76830 TRANSVAGINAL US NON-OB: CPT | Mod: 26,,, | Performed by: STUDENT IN AN ORGANIZED HEALTH CARE EDUCATION/TRAINING PROGRAM

## 2024-02-06 ENCOUNTER — TELEPHONE (OUTPATIENT)
Dept: OBSTETRICS AND GYNECOLOGY | Facility: CLINIC | Age: 44
End: 2024-02-06
Payer: COMMERCIAL

## 2024-02-06 NOTE — TELEPHONE ENCOUNTER
----- Message from Holly Castaneda PA-C sent at 2/5/2024  2:54 PM CST -----  Regarding: Reschedule with MD  Pls r/s this patient with an MD for fibroid consult.

## 2024-02-06 NOTE — TELEPHONE ENCOUNTER
Yan id number [id number] called pt twice both calls was unsuccessful.  perri notifying pt she was scheduled incorrectly with PA and will be rescheduled with a physician as a fibroid consult. Pt can call our office if she has any additional questions

## 2024-02-07 ENCOUNTER — TELEPHONE (OUTPATIENT)
Dept: OBSTETRICS AND GYNECOLOGY | Facility: CLINIC | Age: 44
End: 2024-02-07
Payer: COMMERCIAL

## 2024-02-07 ENCOUNTER — PATIENT MESSAGE (OUTPATIENT)
Dept: OBSTETRICS AND GYNECOLOGY | Facility: CLINIC | Age: 44
End: 2024-02-07
Payer: COMMERCIAL

## 2024-02-07 NOTE — TELEPHONE ENCOUNTER
christian id number [id number] left vm for pt to return call to discuss she need to follow up with MD for fibroid consult

## 2024-02-07 NOTE — TELEPHONE ENCOUNTER
----- Message from Mona Hargrove sent at 2/6/2024  6:02 PM CST -----  Type:  Needs Medical Advice    Who Called: Pt  Would the patient rather a call back or a response via [facility]? Callback  Best Call Back Number: [phone]  Additional Information: Pt is requesting a callback from this provider office in regards to appt

## 2024-02-08 ENCOUNTER — TELEPHONE (OUTPATIENT)
Dept: OBSTETRICS AND GYNECOLOGY | Facility: CLINIC | Age: 44
End: 2024-02-08
Payer: COMMERCIAL

## 2024-02-08 NOTE — TELEPHONE ENCOUNTER
Spoke with pt to notify per ADRIANO Barrera she is to f/u with an MD for fibroids consult. Pt verbalized understanding and weill keep her upcoming appointment with Dr Goldberg

## 2024-02-08 NOTE — TELEPHONE ENCOUNTER
----- Message from Isaías Douglas sent at 2/7/2024  9:28 AM CST -----  Contact: pt  .Type:  Needs Medical Advice    Who Called: pt    Would the patient rather a call back or a response via [facility]?  Call back  Best Call Back Number:  [phone]  Additional Information:  Pt. Is returning a call back to the [facility]

## 2024-02-15 ENCOUNTER — PATIENT MESSAGE (OUTPATIENT)
Dept: OBSTETRICS AND GYNECOLOGY | Facility: CLINIC | Age: 44
End: 2024-02-15
Payer: COMMERCIAL

## 2024-02-20 ENCOUNTER — OFFICE VISIT (OUTPATIENT)
Dept: OBSTETRICS AND GYNECOLOGY | Facility: CLINIC | Age: 44
End: 2024-02-20
Payer: COMMERCIAL

## 2024-02-20 VITALS — DIASTOLIC BLOOD PRESSURE: 72 MMHG | SYSTOLIC BLOOD PRESSURE: 120 MMHG | BODY MASS INDEX: 21.13 KG/M2 | WEIGHT: 127 LBS

## 2024-02-20 DIAGNOSIS — R10.2 PELVIC PAIN: Primary | ICD-10-CM

## 2024-02-20 DIAGNOSIS — Z00.00 ROUTINE GENERAL MEDICAL EXAMINATION AT A HEALTH CARE FACILITY: ICD-10-CM

## 2024-02-20 PROCEDURE — 99213 OFFICE O/P EST LOW 20 MIN: CPT | Mod: S$GLB,,, | Performed by: STUDENT IN AN ORGANIZED HEALTH CARE EDUCATION/TRAINING PROGRAM

## 2024-02-20 PROCEDURE — 99999 PR PBB SHADOW E&M-EST. PATIENT-LVL II: CPT | Mod: PBBFAC,,, | Performed by: STUDENT IN AN ORGANIZED HEALTH CARE EDUCATION/TRAINING PROGRAM

## 2024-02-20 PROCEDURE — 3074F SYST BP LT 130 MM HG: CPT | Mod: CPTII,S$GLB,, | Performed by: STUDENT IN AN ORGANIZED HEALTH CARE EDUCATION/TRAINING PROGRAM

## 2024-02-20 PROCEDURE — 1159F MED LIST DOCD IN RCRD: CPT | Mod: CPTII,S$GLB,, | Performed by: STUDENT IN AN ORGANIZED HEALTH CARE EDUCATION/TRAINING PROGRAM

## 2024-02-20 PROCEDURE — 3008F BODY MASS INDEX DOCD: CPT | Mod: CPTII,S$GLB,, | Performed by: STUDENT IN AN ORGANIZED HEALTH CARE EDUCATION/TRAINING PROGRAM

## 2024-02-20 PROCEDURE — 3078F DIAST BP <80 MM HG: CPT | Mod: CPTII,S$GLB,, | Performed by: STUDENT IN AN ORGANIZED HEALTH CARE EDUCATION/TRAINING PROGRAM

## 2024-02-20 PROCEDURE — 3044F HG A1C LEVEL LT 7.0%: CPT | Mod: CPTII,S$GLB,, | Performed by: STUDENT IN AN ORGANIZED HEALTH CARE EDUCATION/TRAINING PROGRAM

## 2024-02-20 RX ORDER — SIMETHICONE 80 MG
80 TABLET,CHEWABLE ORAL EVERY 6 HOURS PRN
Qty: 30 TABLET | Refills: 1 | Status: SHIPPED | OUTPATIENT
Start: 2024-02-20 | End: 2024-03-25

## 2024-02-20 NOTE — PROGRESS NOTES
CC: follow up RLQ pain     HPI:  Compa Beard is a 43 y.o. female  presents with complaint of continued RLQ pain. Had US in January to follow up on fibroids, was unable to see right ovary well due to overlying gas. Patient is worried there may be a cyst on the ovary causing her pain. Feels like sharp/gas/fullness pain. She has simethicone at home but has not taken it.     ROS:  GENERAL: No fever, chills, fatigability or weight loss.  VULVAR: No pain, no lesions and no itching.  VAGINAL: No relaxation, no itching, no discharge, no abnormal bleeding and no lesions.  ABDOMEN: Denies nausea. Denies vomiting. No diarrhea. No constipation  BREAST: Denies pain. No lumps. No discharge.  URINARY: No incontinence, no nocturia, no frequency and no dysuria.  CARDIOVASCULAR: No chest pain. No shortness of breath. No leg cramps.  NEUROLOGICAL: No headaches. No vision changes.      Patient History:  Past Medical History:   Diagnosis Date    Atopic neurodermatitis 07/10/2019    COVID-19 06/15/2022    Family history of heart disease in male family member before age 55 2019    Fish allergy 2021    Helicobacter pylori infection 2021 H. Pylori Triple Therapy: 1. Take Pantoprazole 40 m tablet twice daily for 14 days. 2. Take Clarithromycin 500 m tablet twice daily for 14 days. 3. Take Metronidazole 500 m tablet three times daily for 14 days.  Do not drink alcohol during this treatment.     Mild intermittent asthma without complication 2020    Photosensitivity of skin 07/10/2019    Vitamin D deficiency 2017     History reviewed. No pertinent surgical history.  Social History     Tobacco Use    Smoking status: Never    Smokeless tobacco: Never   Substance Use Topics    Alcohol use: No    Drug use: No     Family History   Problem Relation Age of Onset    No Known Problems Mother     Heart disease Father 55        CAD s/p stent    No Known Problems Sister     No Known Problems  Brother     Breast cancer Neg Hx     Colon cancer Neg Hx     Ovarian cancer Neg Hx     Diabetes Neg Hx     Hypertension Neg Hx      OB History    Para Term  AB Living   1 1 1     1   SAB IAB Ectopic Multiple Live Births           1      # Outcome Date GA Lbr Darryl/2nd Weight Sex Delivery Anes PTL Lv   1 Term 04    F Vag-Spont   ALVAREZ       Objective:   /72   Wt 57.6 kg (126 lb 15.8 oz)   LMP 2024   BMI 21.13 kg/m²   Patient's last menstrual period was 2024.      PHYSICAL EXAM:  APPEARANCE: Well nourished, well developed, in no acute distress.  AFFECT: WNL, alert and oriented x 3  SKIN: No acne or hirsutism  NECK: Neck symmetric without masses or thyromegaly  CHEST: Good respiratory effect  EXTREMITIES: No edema.      ASSESSMENT and PLAN:    ICD-10-CM ICD-9-CM    1. Pelvic pain  R10.2 [id number] US Pelvis Comp with Transvag NON-OB (xpd          Right pelvic pain  - right LQ/pelvis, gas like pain. Reviewed with patient to take simethicone to help relief pain, can use motrin/tylenol as wel  - repeat US ordered per patient request  - if no abnormality will have patient see GI  - does not want to do pap smear today as her period just started, will reschedule [employer] in upcoming months       Follow up: as needed if symptoms worsen or for WWE      Milla Goldberg MD  OBGYN [facility]

## 2024-02-29 ENCOUNTER — HOSPITAL ENCOUNTER (OUTPATIENT)
Dept: RADIOLOGY | Facility: HOSPITAL | Age: 44
Discharge: HOME | End: 2024-02-29
Attending: STUDENT IN AN ORGANIZED HEALTH CARE EDUCATION/TRAINING PROGRAM
Payer: COMMERCIAL

## 2024-02-29 DIAGNOSIS — R10.2 PELVIC PAIN: ICD-10-CM

## 2024-02-29 PROCEDURE — 76830 TRANSVAGINAL US NON-OB: CPT | Mod: 26,,, | Performed by: RADIOLOGY

## 2024-02-29 PROCEDURE — 76856 US EXAM PELVIC COMPLETE: CPT | Mod: 26,,, | Performed by: RADIOLOGY

## 2024-02-29 PROCEDURE — 76856 US EXAM PELVIC COMPLETE: CPT | Mod: TC

## 2024-03-25 ENCOUNTER — OFFICE VISIT (OUTPATIENT)
Dept: INTERNAL MEDICINE | Facility: CLINIC | Age: 44
End: 2024-03-25
Payer: COMMERCIAL

## 2024-03-25 VITALS
BODY MASS INDEX: 21.09 KG/M2 | DIASTOLIC BLOOD PRESSURE: 62 MMHG | HEART RATE: 70 BPM | HEIGHT: 65 IN | OXYGEN SATURATION: 98 % | WEIGHT: 126.56 LBS | SYSTOLIC BLOOD PRESSURE: 100 MMHG

## 2024-03-25 DIAGNOSIS — Z12.11 SCREENING FOR MALIGNANT NEOPLASM OF COLON: ICD-10-CM

## 2024-03-25 DIAGNOSIS — K29.50 OTHER CHRONIC GASTRITIS WITHOUT HEMORRHAGE: Primary | ICD-10-CM

## 2024-03-25 DIAGNOSIS — Z12.31 ENCOUNTER FOR SCREENING MAMMOGRAM FOR BREAST CANCER: ICD-10-CM

## 2024-03-25 DIAGNOSIS — Z86.19 HISTORY OF HELICOBACTER INFECTION: ICD-10-CM

## 2024-03-25 PROCEDURE — 3008F BODY MASS INDEX DOCD: CPT | Mod: CPTII,S$GLB,, | Performed by: INTERNAL MEDICINE

## 2024-03-25 PROCEDURE — 3074F SYST BP LT 130 MM HG: CPT | Mod: CPTII,S$GLB,, | Performed by: INTERNAL MEDICINE

## 2024-03-25 PROCEDURE — 3078F DIAST BP <80 MM HG: CPT | Mod: CPTII,S$GLB,, | Performed by: INTERNAL MEDICINE

## 2024-03-25 PROCEDURE — 3044F HG A1C LEVEL LT 7.0%: CPT | Mod: CPTII,S$GLB,, | Performed by: INTERNAL MEDICINE

## 2024-03-25 PROCEDURE — 1159F MED LIST DOCD IN RCRD: CPT | Mod: CPTII,S$GLB,, | Performed by: INTERNAL MEDICINE

## 2024-03-25 PROCEDURE — 99999 PR PBB SHADOW E&M-EST. PATIENT-LVL IV: CPT | Mod: PBBFAC,,, | Performed by: INTERNAL MEDICINE

## 2024-03-25 PROCEDURE — 99214 OFFICE O/P EST MOD 30 MIN: CPT | Mod: S$GLB,,, | Performed by: INTERNAL MEDICINE

## 2024-03-25 RX ORDER — HYOSCYAMINE SULFATE 0.12 MG/1
0.12 TABLET SUBLINGUAL EVERY 6 HOURS PRN
Qty: 90 TABLET | Refills: 11 | Status: SHIPPED | OUTPATIENT
Start: 2024-03-25

## 2024-03-25 NOTE — PROGRESS NOTES
INTERNAL MEDICINE CLINIC  Follow-up Visit Progress Note     PRESENTING HISTORY     PCP: Guzman Salcedo MD    Last Clinic Visit with me: 2024    Current Chief Complaint/Problem:    Chief Complaint   Patient presents with    Abdominal Pain      History of Present Illness & ROS: Ms. Compa Beard is a 43 y.o. female.    LLQ bloating sensation since January.  She has seen GYN in .    PAST HISTORY:     Past Medical History:   Diagnosis Date    Atopic neurodermatitis 07/10/2019    COVID-19 06/15/2022    Family history of heart disease in male family member before age 55 2019    Fish allergy 2021    Helicobacter pylori infection 2021 H. Pylori Triple Therapy: 1. Take Pantoprazole 40 m tablet twice daily for 14 days. 2. Take Clarithromycin 500 m tablet twice daily for 14 days. 3. Take Metronidazole 500 m tablet three times daily for 14 days.  Do not drink alcohol during this treatment.     Mild intermittent asthma without complication 2020    Photosensitivity of skin 07/10/2019    Vitamin D deficiency 2017       No past surgical history on file.    Family History   Problem Relation Age of Onset    No Known Problems Mother     Heart disease Father 55        CAD s/p stent    No Known Problems Sister     No Known Problems Brother     Breast cancer Neg Hx     Colon cancer Neg Hx     Ovarian cancer Neg Hx     Diabetes Neg Hx     Hypertension Neg Hx        Social History     Socioeconomic History    Marital status:     Number of children: 1   Occupational History    Occupation: sales   Tobacco Use    Smoking status: Never    Smokeless tobacco: Never   Substance and Sexual Activity    Alcohol use: No    Drug use: No    Sexual activity: Yes     Partners: Male     Birth control/protection: None, Condom     Comment:  since    Social History Narrative    She sales items on boats.    : 1 child.    Lived in [place] for 7 yrs now (as of )        MEDICATIONS & ALLERGIES:     Current Outpatient Medications on File Prior to Visit   Medication Sig Dispense Refill    albuterol (VENTOLIN HFA) 90 mcg/actuation inhaler Inhale 2 puffs into the lungs every 6 (six) hours as needed for Wheezing. (Patient not taking: Reported on 1/18/2024) 18 g 11    EPINEPHrine (EPIPEN) 0.3 mg/0.3 mL AtIn Inject 0.3 mLs (0.3 mg total) into the muscle once. for 1 dose 0.3 mL 0     Review of patient's allergies indicates:   Allergen Reactions    Fish containing products Swelling     Facial swelling (lips, eyes, all areas)       Medications Reconciliation:   I have reconciled the patient's home medications and discharge medications with the patient/family. I have updated all changes.  Refer to After-Visit Medication List.    OBJECTIVE:     Vital Signs:  Vitals:    03/25/24 0803   BP: 100/62   Pulse: 70     Wt Readings from Last 3 Encounters:   03/25/24 0803 57.4 kg (126 lb 8.7 oz)   02/20/24 1327 57.6 kg (126 lb 15.8 oz)   01/18/24 1729 57.9 kg (127 lb 10.3 oz)     Body mass index is 21.06 kg/m².     Physical Exam:  General: Well developed, well nourished. No distress.  HEENT: Head is normocephalic, atraumatic;  Eyes: Clear conjunctiva.  Neck: Supple, symmetrical neck; trachea midline.  Lungs: normal respiratory effort.  Cardiovascular: Heart with regular rate and rhythm.    Abdomen: Abdomen is soft, non-tender non-distended.   Musculoskeletal: Normal gait.   Psychiatric: Normal affect. Alert.      Laboratory  Lab Results   Component Value Date    WBC 5.12 01/02/2024    HGB 13.3 01/02/2024    HCT 41.5 01/02/2024     01/02/2024    CHOL 203 (H) 01/02/2024    TRIG 72 01/02/2024    HDL 50 01/02/2024    ALT 10 01/02/2024    AST 16 01/02/2024     01/02/2024    K 4.3 01/02/2024     01/02/2024    CREATININE 0.7 01/02/2024    BUN 10 01/02/2024    CO2 26 01/02/2024    TSH 1.809 01/02/2024    HGBA1C 5.5 01/02/2024         ASSESSMENT & PLAN:     RLL quadrant bloating  -  Negative US pelvis recently.    She is turning 44 in May.    Will get EGD and C-scope:  -     Ambulatory referral/consult to Endo Procedure ; Future; Expected date: 2024  -     hyoscyamine (LEVSIN/SL) 0.125 mg Subl; Place 1 tablet (0.125 mg total) under the tongue every 6 (six) hours as needed (abdominal spasm).  Dispense: 90 tablet; Refill: 11    Other Medical Problems Reviewed:    Mild intermittent chronic asthma   - Stable without any attacks in the past year.  -     albuterol (VENTOLIN HFA) 90 mcg/actuation inhaler; Inhale 2 puffs into the lungs every 6 (six) hours as needed for Wheezing.       Vitamin D deficiency  -    Was on replacement before. Not taking anything anymore.     History of H pylori infection  Chronic gastritis without bleeding  -     H. PYLORI ANTIBODY, IGG;21 Positive              2021              1. Take Pantoprazole 40 m tablet twice daily for 14 days.              2. Take Clarithromycin 500 m tablet twice daily for 14 days.              3. Take Metronidazole 500 m tablet three times daily for 14 days     -     US Abdomen 2021: Normal  -     H. pylori antigen stool negative: 2021     [place]:  3-      - Esophagus normal.  Mild gastritis.  Negative H pylori.     - Doing well. Not on any meds.    -     Ambulatory referral/consult to Endo Procedure  for EGD and C-scope.     Family history of heart disease in male family member before age 55  - No chest pain symptoms.     Atopic neurodermatitis  Photosensitivity of skin  - Off all meds.     Fish allergy  -     EPINEPHrine (EPIPEN) 0.3 mg/0.3 mL AtIn; Inject 0.3 mLs (0.3 mg total) into the muscle once. for 1 dose     Uterine leiomyoma, unspecified location  2024  Two uterine fibroids, similar to prior.       Preventive Health Maintenance:     Flu vaccine - refused.     Encounter for screening mammogram for breast cancer  -     Mammo Digital Screening Bilat; Future;  Expected date: 03/25/2024    Return to Clinic for Follow Up with me:  1 Year.    Scheduled Follow-up :  Future Appointments   Date Time Provider Department Center   4/1/2024  9:20 AM PRIV PRE-ADMIT, ENDO -[facility] ENDOPP4 [facility]       After Visit Medication List :     Medication List            Accurate as of March 25, 2024  8:31 AM. If you have any questions, ask your nurse or doctor.                START taking these medications      hyoscyamine 0.125 mg Subl  Commonly known as: LEVSIN/SL  Place 1 tablet (0.125 mg total) under the tongue every 6 (six) hours as needed (abdominal spasm).  Started by: Guzman Salcedo MD            CONTINUE taking these medications      albuterol 90 mcg/actuation inhaler  Commonly known as: VENTOLIN HFA  Inhale 2 puffs into the lungs every 6 (six) hours as needed for Wheezing.     EPINEPHrine 0.3 mg/0.3 mL Atin  Commonly known as: EPIPEN  Inject 0.3 mLs (0.3 mg total) into the muscle once. for 1 dose            STOP taking these medications      simethicone 80 MG chewable tablet  Commonly known as: MYLICON  Stopped by: Guzman Salcedo MD               Where to Get Your Medications        These medications were sent to [id number]  [place] [address]  [address] [address]      Phone: [phone]   hyoscyamine 0.125 mg Subl         Signing Physician:  Guzman Salcedo MD

## 2024-04-01 ENCOUNTER — TELEPHONE (OUTPATIENT)
Dept: ENDOSCOPY | Facility: HOSPITAL | Age: 44
End: 2024-04-01

## 2024-04-01 ENCOUNTER — CLINICAL SUPPORT (OUTPATIENT)
Dept: ENDOSCOPY | Facility: HOSPITAL | Age: 44
End: 2024-04-01
Attending: INTERNAL MEDICINE
Payer: COMMERCIAL

## 2024-04-01 DIAGNOSIS — K29.50 OTHER CHRONIC GASTRITIS WITHOUT HEMORRHAGE: ICD-10-CM

## 2024-04-01 DIAGNOSIS — Z12.11 SCREENING FOR MALIGNANT NEOPLASM OF COLON: ICD-10-CM

## 2024-04-01 DIAGNOSIS — Z86.19 HISTORY OF HELICOBACTER INFECTION: ICD-10-CM

## 2024-04-01 NOTE — TELEPHONE ENCOUNTER
Spoke to pt with Mandarin  ID# [id number], to schedule procedure(s) Colonoscopy/EGD       Physician to perform procedure(s) Dr. SHAYY Golden  Date of Procedure (s) 6/24/24  Arrival Time 8:30 AM  Time of Procedure(s) 9:30 AM   Location of Procedure(s) [place] [address] Floor  Type of Rx Prep sent to patient: PEG  Instructions provided to patient via [facility]    Patient was informed on the following information and verbalized understanding. Screening questionnaire reviewed with patient and complete. If procedure requires anesthesia, a responsible adult needs to be present to accompany the patient home, patient cannot drive after receiving anesthesia. Appointment details are tentative, especially check-in time. Patient will receive a prep-op call 7 days prior to confirm check-in time for procedure. If applicable the patient should contact their pharmacy to verify Rx for procedure prep is ready for pick-up. Patient was advised to call the scheduling department at [phone] if pharmacy states no Rx is available. Patient was advised to call the endoscopy scheduling department if any questions or concerns arise.      [facility] Scheduling Department

## 2024-04-05 ENCOUNTER — PATIENT MESSAGE (OUTPATIENT)
Dept: INTERNAL MEDICINE | Facility: CLINIC | Age: 44
End: 2024-04-05
Payer: COMMERCIAL

## 2024-04-05 ENCOUNTER — TELEPHONE (OUTPATIENT)
Dept: INTERNAL MEDICINE | Facility: CLINIC | Age: 44
End: 2024-04-05
Payer: COMMERCIAL

## 2024-04-05 DIAGNOSIS — R10.31 RIGHT LOWER QUADRANT ABDOMINAL PAIN: Primary | ICD-10-CM

## 2024-04-11 ENCOUNTER — PATIENT MESSAGE (OUTPATIENT)
Dept: OBSTETRICS AND GYNECOLOGY | Facility: CLINIC | Age: 44
End: 2024-04-11
Payer: COMMERCIAL

## 2024-05-06 ENCOUNTER — TELEPHONE (OUTPATIENT)
Dept: GASTROENTEROLOGY | Facility: CLINIC | Age: 44
End: 2024-05-06
Payer: COMMERCIAL

## 2024-05-14 ENCOUNTER — OFFICE VISIT (OUTPATIENT)
Dept: GASTROENTEROLOGY | Facility: CLINIC | Age: 44
End: 2024-05-14
Payer: COMMERCIAL

## 2024-05-14 DIAGNOSIS — R10.31 RIGHT LOWER QUADRANT ABDOMINAL PAIN: ICD-10-CM

## 2024-05-14 PROCEDURE — 3044F HG A1C LEVEL LT 7.0%: CPT | Mod: CPTII,S$GLB,, | Performed by: INTERNAL MEDICINE

## 2024-05-14 PROCEDURE — 99999 PR PBB SHADOW E&M-EST. PATIENT-LVL II: CPT | Mod: PBBFAC,,, | Performed by: INTERNAL MEDICINE

## 2024-05-14 PROCEDURE — 1159F MED LIST DOCD IN RCRD: CPT | Mod: CPTII,S$GLB,, | Performed by: INTERNAL MEDICINE

## 2024-05-14 PROCEDURE — 99204 OFFICE O/P NEW MOD 45 MIN: CPT | Mod: S$GLB,,, | Performed by: INTERNAL MEDICINE

## 2024-05-14 RX ORDER — DICYCLOMINE HYDROCHLORIDE 10 MG/1
10 CAPSULE ORAL 4 TIMES DAILY PRN
Qty: 120 CAPSULE | Refills: 3 | Status: SHIPPED | OUTPATIENT
Start: 2024-05-14

## 2024-05-14 NOTE — PROGRESS NOTES
"Subjective:       Patient ID: Compa Beard is a 43 y.o. female.    Chief Complaint: Gas and Bloated     Patient here today to establish care with aforementioned complaints.  Patient reports new onset abdominal "discomfort" which began this past January.  She characterizes complaints as constant "gas" that is localized in right side of abdomen.  She reported these complaints to her PCP who referred her to gyn.  Ultrasound did not show significant uterine/ovarian abnormalities.  Image was somewhat obstructive by GI gas pattern    Always feels bloated. Does not seem to modified by eating. Was prescribed levsin. Did not seem to help.    Has a some improvement with modifying diet: avoiding sugar, salty, greasy foods.     Constipation. Bm every 2-3 days. BM does not seem to modify pain.     EGD in : h pylori gastritis. Treated. Was asymptomatic at the time so didn't notice a difference with treatment.  EGD/colonoscopy scheduled for next month    #[id number]      Past Medical History:   Diagnosis Date    Atopic neurodermatitis 07/10/2019    COVID-19 06/15/2022    Family history of heart disease in male family member before age 55 2019    Fish allergy 2021    Helicobacter pylori infection 2021 H. Pylori Triple Therapy: 1. Take Pantoprazole 40 m tablet twice daily for 14 days. 2. Take Clarithromycin 500 m tablet twice daily for 14 days. 3. Take Metronidazole 500 m tablet three times daily for 14 days.  Do not drink alcohol during this treatment.     Mild intermittent asthma without complication 2020    Photosensitivity of skin 07/10/2019    Vitamin D deficiency 2017       No past surgical history on file.    Social History  Social History     Tobacco Use    Smoking status: Never    Smokeless tobacco: Never   Substance Use Topics    Alcohol use: No    Drug use: No       Family History   Problem Relation Name Age of Onset    No Known Problems Mother      Heart disease Father  " 55        CAD s/p stent    No Known Problems Sister      No Known Problems Brother      Breast cancer Neg Hx      Colon cancer Neg Hx      Ovarian cancer Neg Hx      Diabetes Neg Hx      Hypertension Neg Hx         Review of Systems   Gastrointestinal:  Positive for abdominal distention, abdominal pain and nausea.           Objective:      Physical Exam  Constitutional:       Appearance: She is well-developed.   HENT:      Head: Normocephalic and atraumatic.   Eyes:      Conjunctiva/sclera: Conjunctivae normal.   Pulmonary:      Effort: Pulmonary effort is normal. No respiratory distress.   Musculoskeletal:      Cervical back: Normal range of motion.   Neurological:      Mental Status: She is alert and oriented to person, place, and time.   Psychiatric:         Behavior: Behavior normal.         Thought Content: Thought content normal.         Judgment: Judgment normal.           Pertinent labs and imaging studies reviewed    Assessment:       1. Right lower quadrant abdominal pain        Plan:       Trial of Bentyl   Proceed with EGD/colonoscopy.  Follow-up with endoscopist thereafter    (Portions of this note were dictated using voice recognition software and may contain dictation related errors in spelling/grammar/syntax not found on text review)

## 2024-05-23 ENCOUNTER — TELEPHONE (OUTPATIENT)
Dept: GASTROENTEROLOGY | Facility: CLINIC | Age: 44
End: 2024-05-23
Payer: COMMERCIAL

## 2024-05-23 NOTE — TELEPHONE ENCOUNTER
----- Message from Chase Schultz sent at 5/23/2024  4:33 PM CDT -----  Regarding: Patient Endoscopy Reschedule Request  Contact: Compa [phone]  [email or web address]     Patient is calling for Medical Advice regarding: Patient has endoscopy soon and would like to see if she can come in at 7 AM instead of 9:30    How long has patient had these symptoms:    Pharmacy name and phone#:    Would like response via [employer]:  ##    Comments: Call back patient to confirm or deny asap

## 2024-05-30 ENCOUNTER — TELEPHONE (OUTPATIENT)
Dept: GASTROENTEROLOGY | Facility: CLINIC | Age: 44
End: 2024-05-30
Payer: COMMERCIAL

## 2024-05-30 NOTE — TELEPHONE ENCOUNTER
----- Message from Jami Salas sent at 5/30/2024  1:28 PM CDT -----  Contact: [phone]  2nd message       Pt is requesting a callback to reschedule her appt time on 06/24. Pt would like to be seen earlier than her 9:30 am appt. Pt would like to be seen at 7 am.       Per pt, this is her second call in regards to this matter. Pt called last week as well. Per pt, please call as soon as possible.               Thank you

## 2024-05-30 NOTE — TELEPHONE ENCOUNTER
----- Message from Jami Salas sent at 5/30/2024  1:28 PM CDT -----  Contact: [phone]  2nd message       Pt is requesting a callback to reschedule her appt time on 06/24. Pt would like to be seen earlier than her 9:30 am appt. Pt would like to be seen at 7 am.       Per pt, this is her second call in regards to this matter. Pt called last week as well. Per pt, please call as soon as possible.               Thank you

## 2024-06-17 ENCOUNTER — TELEPHONE (OUTPATIENT)
Dept: ENDOSCOPY | Facility: HOSPITAL | Age: 44
End: 2024-06-17
Payer: COMMERCIAL

## 2024-06-18 ENCOUNTER — ANESTHESIA EVENT (OUTPATIENT)
Dept: ENDOSCOPY | Facility: HOSPITAL | Age: 44
End: 2024-06-18
Payer: COMMERCIAL

## 2024-06-18 NOTE — ANESTHESIA PREPROCEDURE EVALUATION
2024  Compa Beard is a 44 y.o., female.  Procedure: EGD (ESOPHAGOGASTRODUODENOSCOPY) (N/A), COLONOSCOPY (N/A)         Patient Active Problem List   Diagnosis    Vitamin D deficiency    Family history of heart disease in male family member before age 55    Atopic neurodermatitis    Photosensitivity of skin    Mild intermittent asthma without complication    Fish allergy    History of Helicobacter infection    Chronic gastritis without bleeding    Fibroid, uterine       Past Medical History:   Diagnosis Date    Atopic neurodermatitis 07/10/2019    COVID-19 06/15/2022    Family history of heart disease in male family member before age 55 2019    Fish allergy 2021    Helicobacter pylori infection 2021 H. Pylori Triple Therapy: 1. Take Pantoprazole 40 m tablet twice daily for 14 days. 2. Take Clarithromycin 500 m tablet twice daily for 14 days. 3. Take Metronidazole 500 m tablet three times daily for 14 days.  Do not drink alcohol during this treatment.     Mild intermittent asthma without complication 2020    Photosensitivity of skin 07/10/2019    Vitamin D deficiency 2017       ECHO: No results found for this or any previous visit.      There is no height or weight on file to calculate BMI.    Tobacco Use: Low Risk  (2024)    Patient History     Smoking Tobacco Use: Never     Smokeless Tobacco Use: Never     Passive Exposure: Not on file       Social History     Substance and Sexual Activity   Drug Use No        Alcohol Use: Not on file       Review of patient's allergies indicates:   Allergen Reactions    Fish containing products Swelling     Facial swelling (lips, eyes, all areas)         Airway:  No value filed.      Pre-op Assessment    I have reviewed the Patient Summary Reports.    I have reviewed the NPO Status.   I have  reviewed the Medications.     Review of Systems  Anesthesia Hx:             Denies Family Hx of Anesthesia complications.    Denies Personal Hx of Anesthesia complications.                    Hematology/Oncology:  Hematology Normal   Oncology Normal                                   EENT/Dental:  EENT/Dental Normal           Cardiovascular:  Cardiovascular Normal                                            Pulmonary:    Asthma mild                   Renal/:  Renal/ Normal                 Hepatic/GI:  Hepatic/GI Normal                 Musculoskeletal:  Musculoskeletal Normal                Neurological:  Neurology Normal                                      Endocrine:  Endocrine Normal            Dermatological:  Skin Normal    Psych:  Psychiatric Normal                  Physical Exam    Airway:  Mallampati: II     Anesthesia Plan  Type of Anesthesia, risks & benefits discussed:    Anesthesia Type: Gen Natural Airway  Intra-op Monitoring Plan: Standard ASA Monitors  Post Op Pain Control Plan: multimodal analgesia  Induction:  IV  Informed Consent: Informed consent signed with the Patient and all parties understand the risks and agree with anesthesia plan.  All questions answered.   ASA Score: 2  Day of Surgery Review of History & Physical: H&P Update referred to the surgeon/provider.    Ready For Surgery From Anesthesia Perspective.     .

## 2024-06-23 ENCOUNTER — NURSE TRIAGE (OUTPATIENT)
Dept: ADMINISTRATIVE | Facility: CLINIC | Age: 44
End: 2024-06-23
Payer: COMMERCIAL

## 2024-06-23 NOTE — TELEPHONE ENCOUNTER
Spoke with pt with help from  Mireya ID [id number]. Pt states she has EGD tomorrow. States that she is on her period , and wasn't sure if she would still be  able to proceed with procedure tomorrow.     Spoke with DR. Mendez who states pt able to proceed with EGD tomorrow.    Pt informed, and verbalized understanding.  Reason for Disposition   [1] Follow-up call to recent contact AND [2] information only call, no triage required    Protocols used: Information Only Call - No Triage-A-

## 2024-06-23 NOTE — TELEPHONE ENCOUNTER
Pt is scheduled for EGD tomorrow, states started menstrual cycle this morning & prefers to reschedule at next soonest available date. Requesting message be routed to clinic to notify & callback to reschedule.     Reason for Disposition   Question about upcoming scheduled surgery, procedure or test, no triage required, and triager able to answer question    Protocols used: Information Only Call - No Triage-A-

## 2024-06-24 ENCOUNTER — HOSPITAL ENCOUNTER (OUTPATIENT)
Facility: HOSPITAL | Age: 44
Discharge: HOME | End: 2024-06-24
Attending: INTERNAL MEDICINE | Admitting: INTERNAL MEDICINE
Payer: COMMERCIAL

## 2024-06-24 ENCOUNTER — ANESTHESIA (OUTPATIENT)
Dept: ENDOSCOPY | Facility: HOSPITAL | Age: 44
End: 2024-06-24
Payer: COMMERCIAL

## 2024-06-24 VITALS
HEIGHT: 66 IN | RESPIRATION RATE: 16 BRPM | WEIGHT: 125 LBS | OXYGEN SATURATION: 100 % | DIASTOLIC BLOOD PRESSURE: 63 MMHG | SYSTOLIC BLOOD PRESSURE: 107 MMHG | HEART RATE: 63 BPM | TEMPERATURE: 98 F | BODY MASS INDEX: 20.09 KG/M2

## 2024-06-24 DIAGNOSIS — Z12.11 COLON CANCER SCREENING: Primary | ICD-10-CM

## 2024-06-24 DIAGNOSIS — Z12.11 SCREEN FOR COLON CANCER: ICD-10-CM

## 2024-06-24 LAB
B-HCG UR QL: NEGATIVE
CTP QC/QA: YES

## 2024-06-24 PROCEDURE — 43239 EGD BIOPSY SINGLE/MULTIPLE: CPT | Mod: 51,,, | Performed by: INTERNAL MEDICINE

## 2024-06-24 PROCEDURE — 37000009 HC ANESTHESIA EA ADD 15 MINS: Performed by: INTERNAL MEDICINE

## 2024-06-24 PROCEDURE — 88305 TISSUE EXAM BY PATHOLOGIST: CPT | Performed by: PATHOLOGY

## 2024-06-24 PROCEDURE — 94761 N-INVAS EAR/PLS OXIMETRY MLT: CPT

## 2024-06-24 PROCEDURE — 37000008 HC ANESTHESIA 1ST 15 MINUTES: Performed by: INTERNAL MEDICINE

## 2024-06-24 PROCEDURE — 88342 IMHCHEM/IMCYTCHM 1ST ANTB: CPT | Performed by: PATHOLOGY

## 2024-06-24 PROCEDURE — 99900035 HC TECH TIME PER 15 MIN (STAT)

## 2024-06-24 PROCEDURE — G0121 COLON CA SCRN NOT HI RSK IND: HCPCS | Mod: ,,, | Performed by: INTERNAL MEDICINE

## 2024-06-24 PROCEDURE — 63600175 PHARM REV CODE 636 W HCPCS: Performed by: NURSE ANESTHETIST, CERTIFIED REGISTERED

## 2024-06-24 PROCEDURE — G0121 COLON CA SCRN NOT HI RSK IND: HCPCS | Performed by: INTERNAL MEDICINE

## 2024-06-24 PROCEDURE — 25000003 PHARM REV CODE 250: Performed by: NURSE ANESTHETIST, CERTIFIED REGISTERED

## 2024-06-24 PROCEDURE — 43239 EGD BIOPSY SINGLE/MULTIPLE: CPT | Performed by: INTERNAL MEDICINE

## 2024-06-24 PROCEDURE — 81025 URINE PREGNANCY TEST: CPT | Performed by: INTERNAL MEDICINE

## 2024-06-24 PROCEDURE — 27201012 HC FORCEPS, HOT/COLD, DISP: Performed by: INTERNAL MEDICINE

## 2024-06-24 RX ORDER — PROPOFOL 10 MG/ML
VIAL (ML) INTRAVENOUS CONTINUOUS PRN
Status: DISCONTINUED | OUTPATIENT
Start: 2024-06-24 | End: 2024-06-24

## 2024-06-24 RX ORDER — SODIUM CHLORIDE 9 MG/ML
INJECTION, SOLUTION INTRAVENOUS CONTINUOUS
Status: DISCONTINUED | OUTPATIENT
Start: 2024-06-24 | End: 2024-06-24 | Stop reason: HOSPADM

## 2024-06-24 RX ORDER — LIDOCAINE HYDROCHLORIDE 20 MG/ML
INJECTION INTRAVENOUS
Status: DISCONTINUED | OUTPATIENT
Start: 2024-06-24 | End: 2024-06-24

## 2024-06-24 RX ADMIN — Medication 200 MCG/KG/MIN: at 11:06

## 2024-06-24 RX ADMIN — SODIUM CHLORIDE: 0.9 INJECTION, SOLUTION INTRAVENOUS at 10:06

## 2024-06-24 RX ADMIN — GLYCOPYRROLATE 0.2 MG: 0.2 INJECTION, SOLUTION INTRAMUSCULAR; INTRAVENOUS at 11:06

## 2024-06-24 RX ADMIN — Medication 100 MG: at 11:06

## 2024-06-24 RX ADMIN — LIDOCAINE HYDROCHLORIDE 100 MG: 20 INJECTION INTRAVENOUS at 11:06

## 2024-06-24 NOTE — ANESTHESIA POSTPROCEDURE EVALUATION
Anesthesia Post Evaluation    Patient: Compa Beard    Procedure(s) Performed: Procedure(s) (LRB):  EGD (ESOPHAGOGASTRODUODENOSCOPY) (N/A)  COLONOSCOPY (N/A)    Final Anesthesia Type: general      Patient location during evaluation: PACU  Patient participation: Yes- Able to Participate  Level of consciousness: awake and alert  Post-procedure vital signs: reviewed and stable  Pain management: adequate  Airway patency: patent    PONV status at discharge: No PONV  Anesthetic complications: no      Cardiovascular status: stable  Respiratory status: spontaneous ventilation  Hydration status: euvolemic  Follow-up not needed.          Vitals Value Taken Time   /63 06/24/24 1213   Temp 36.8 °C (98.2 °F) 06/24/24 1152   Pulse 63 06/24/24 1211   Resp 16 06/24/24 1211   SpO2 100 % 06/24/24 1211   Vitals shown include unfiled device data.      Event Time   Out of Recovery 12:07:00         Pain/Sofie Score: Sofie Score: 10 (6/24/2024 12:11 PM)

## 2024-06-24 NOTE — PROVATION PATIENT INSTRUCTIONS
Discharge Summary/Instructions after an Endoscopic Procedure  Patient Name: Compa Beard  Patient MRN: [id number]  Patient YOB: 1980 Monday, June 24, 2024  Evelio Golden MD  Dear patient,  As a result of recent federal legislation (The Federal Cures Act), you may   receive lab or pathology results from your procedure in your [facility]   account before your physician is able to contact you. Your physician or   their representative will relay the results to you with their   recommendations at their soonest availability.  Thank you,  RESTRICTIONS:  During your procedure today, you received medications for sedation.  These   medications may affect your judgment, balance and coordination.  Therefore,   for 24 hours, you have the following restrictions:   - DO NOT drive a car, operate machinery, make legal/financial decisions,   sign important papers or drink alcohol.    ACTIVITY:  Today: no heavy lifting, straining or running due to procedural   sedation/anesthesia.  The following day: return to full activity including work.  DIET:  Eat and drink normally unless instructed otherwise.     TREATMENT FOR COMMON SIDE EFFECTS:  - Mild abdominal pain, nausea, belching, bloating or excessive gas:  rest,   eat lightly and use a heating pad.  - Sore Throat: treat with throat lozenges and/or gargle with warm salt   water.  - Because air was used during the procedure, expelling large amounts of air   from your rectum or belching is normal.  - If a bowel prep was taken, you may not have a bowel movement for 1-3 days.    This is normal.  SYMPTOMS TO WATCH FOR AND REPORT TO YOUR PHYSICIAN:  1. Abdominal pain or bloating, other than gas cramps.  2. Chest pain.  3. Back pain.  4. Signs of infection such as: chills or fever occurring within 24 hours   after the procedure.  5. Rectal bleeding, which would show as bright red, maroon, or black stools.   (A tablespoon of blood from the rectum is not serious, especially if    hemorrhoids are present.)  6. Vomiting.  7. Weakness or dizziness.  GO DIRECTLY TO THE NEAREST EMERGENCY ROOM IF YOU HAVE ANY OF THE FOLLOWING:      Difficulty breathing              Chills and/or fever over 101 F   Persistent vomiting and/or vomiting blood   Severe abdominal pain   Severe chest pain   Black, tarry stools   Bleeding- more than one tablespoon   Any other symptom or condition that you feel may need urgent attention  Your doctor recommends these additional instructions:  If any biopsies were taken, your doctors clinic will contact you in 1 to 2   weeks with any results.  - Patient has a contact number available for emergencies.  The signs and   symptoms of potential delayed complications were discussed with the   patient.  Return to normal activities tomorrow.  Written discharge   instructions were provided to the patient.   - Discharge patient to home.   - Resume previous diet.   - Continue present medications.   For questions, problems or results please call your physician - Evelio Golden MD at Work:  [phone].  [facility], EMERGENCY ROOM PHONE NUMBER: [phone]  IF A COMPLICATION OR EMERGENCY SITUATION ARISES AND YOU ARE UNABLE TO REACH   YOUR PHYSICIAN - GO DIRECTLY TO THE EMERGENCY ROOM.  Evelio Golden MD  6/24/2024 11:27:19 AM  This report has been verified and signed electronically.  Dear patient,  As a result of recent federal legislation (The Federal Cures Act), you may   receive lab or pathology results from your procedure in your [facility]   account before your physician is able to contact you. Your physician or   their representative will relay the results to you with their   recommendations at their soonest availability.  Thank you,  PROVATION

## 2024-06-24 NOTE — H&P
Short Stay Endoscopy History and Physical    PCP - Guzman Salcedo MD    Procedure - EGD/Colonoscopy  Sedation: GA  ASA - per anesthesia  Mallampati - per anesthesia  History of Anesthesia problems - no  Family history Anesthesia problems -  no     HPI:  This is a 44 y.o. female here for evaluation of : Upper abdominal pain and Screening for CRC    Reflux - no  Dysphagia - no  Abdominal pain - yes  Diarrhea - no    ROS:  Constitutional: No fevers, chills, No weight loss  ENT: No allergies  CV: No chest pain  Pulm: No cough, No shortness of breath  Ophtho: No vision changes  GI: see HPI  Medical History:  has a past medical history of Atopic neurodermatitis (07/10/2019), COVID-19 (06/15/2022), Family history of heart disease in male family member before age 55 (02/20/2019), Fish allergy (07/23/2021), Helicobacter pylori infection (08/05/2021), Mild intermittent asthma without complication (07/29/2020), Photosensitivity of skin (07/10/2019), and Vitamin D deficiency (09/29/2017).    Surgical History:  has no past surgical history on file.    Family History: family history includes Heart disease (age of onset: 55) in her father; No Known Problems in her brother, mother, and sister.. Otherwise no colon cancer, inflammatory bowel disease, or GI malignancies.    Social History:  reports that she has never smoked. She has never used smokeless tobacco. She reports that she does not drink alcohol and does not use drugs.    Review of patient's allergies indicates:   Allergen Reactions    Fish containing products Swelling     Facial swelling (lips, eyes, all areas)       Medications:   Medications Prior to Admission   Medication Sig Dispense Refill Last Dose    albuterol (VENTOLIN HFA) 90 mcg/actuation inhaler Inhale 2 puffs into the lungs every 6 (six) hours as needed for Wheezing. (Patient not taking: Reported on 1/18/2024) 18 g 11     dicyclomine (BENTYL) 10 MG capsule Take 1 capsule (10 mg total) by mouth 4 (four) times  daily as needed. 120 capsule 3     EPINEPHrine (EPIPEN) 0.3 mg/0.3 mL AtIn Inject 0.3 mLs (0.3 mg total) into the muscle once. for 1 dose 0.3 mL 0     hyoscyamine (LEVSIN/SL) 0.125 mg Subl Place 1 tablet (0.125 mg total) under the tongue every 6 (six) hours as needed (abdominal spasm). 90 tablet 11        Objective Findings:    Vital Signs: Per nursing notes.    Physical Exam:  General Appearance: Well appearing in no acute distress  Head:   Normocephalic, without obvious abnormality  Eyes:    No scleral icterus  Airway: Open  Neck: No restriction in mobility  Lungs: CTA bilaterally in anterior and posterior fields, no wheezes, no crackles.  Heart:  Regular rate and rhythm, S1, S2 normal, no murmurs heard  Abdomen: Soft, non tender, non distended      Labs:  Lab Results   Component Value Date    WBC 5.12 01/02/2024    HGB 13.3 01/02/2024    HCT 41.5 01/02/2024     01/02/2024    CHOL 203 (H) 01/02/2024    TRIG 72 01/02/2024    HDL 50 01/02/2024    ALT 10 01/02/2024    AST 16 01/02/2024     01/02/2024    K 4.3 01/02/2024     01/02/2024    CREATININE 0.7 01/02/2024    BUN 10 01/02/2024    CO2 26 01/02/2024    TSH 1.809 01/02/2024    HGBA1C 5.5 01/02/2024         I have explained the risks and benefits of endoscopy procedures to the patient including but not limited to bleeding, perforation, infection, and death.    Thank you so much for allowing me to participate in the care of Compa Golden MD

## 2024-06-24 NOTE — PLAN OF CARE
Dr Jackson at bedside explained anesthesia for procedure and consent using  on video.  Pt signed consent

## 2024-06-24 NOTE — TRANSFER OF CARE
"Anesthesia Transfer of Care Note    Patient: Compa Beard    Procedure(s) Performed: Procedure(s) (LRB):  EGD (ESOPHAGOGASTRODUODENOSCOPY) (N/A)  COLONOSCOPY (N/A)    Patient location: PACU    Anesthesia Type: general    Transport from OR: Transported from OR on room air with adequate spontaneous ventilation    Post pain: adequate analgesia    Post assessment: no apparent anesthetic complications    Post vital signs: stable    Level of consciousness: sedated    Nausea/Vomiting: no nausea/vomiting    Complications: none    Transfer of care protocol was followed      Last vitals: Visit Vitals  /62 (BP Location: Left arm, Patient Position: Lying)   Pulse 62   Temp 36.8 °C (98.3 °F) (Temporal)   Resp 18   Ht 5' 6" (1.676 m)   Wt 56.7 kg (125 lb)   LMP 06/22/2024   SpO2 100%   Breastfeeding No   BMI 20.18 kg/m²     "

## 2024-06-24 NOTE — PROVATION PATIENT INSTRUCTIONS
Discharge Summary/Instructions after an Endoscopic Procedure  Patient Name: Compa Beard  Patient MRN: [id number]  Patient YOB: 1980 Monday, June 24, 2024  Evelio Golden MD  Dear patient,  As a result of recent federal legislation (The Federal Cures Act), you may   receive lab or pathology results from your procedure in your [facility]   account before your physician is able to contact you. Your physician or   their representative will relay the results to you with their   recommendations at their soonest availability.  Thank you,  RESTRICTIONS:  During your procedure today, you received medications for sedation.  These   medications may affect your judgment, balance and coordination.  Therefore,   for 24 hours, you have the following restrictions:   - DO NOT drive a car, operate machinery, make legal/financial decisions,   sign important papers or drink alcohol.    ACTIVITY:  Today: no heavy lifting, straining or running due to procedural   sedation/anesthesia.  The following day: return to full activity including work.  DIET:  Eat and drink normally unless instructed otherwise.     TREATMENT FOR COMMON SIDE EFFECTS:  - Mild abdominal pain, nausea, belching, bloating or excessive gas:  rest,   eat lightly and use a heating pad.  - Sore Throat: treat with throat lozenges and/or gargle with warm salt   water.  - Because air was used during the procedure, expelling large amounts of air   from your rectum or belching is normal.  - If a bowel prep was taken, you may not have a bowel movement for 1-3 days.    This is normal.  SYMPTOMS TO WATCH FOR AND REPORT TO YOUR PHYSICIAN:  1. Abdominal pain or bloating, other than gas cramps.  2. Chest pain.  3. Back pain.  4. Signs of infection such as: chills or fever occurring within 24 hours   after the procedure.  5. Rectal bleeding, which would show as bright red, maroon, or black stools.   (A tablespoon of blood from the rectum is not serious, especially if    hemorrhoids are present.)  6. Vomiting.  7. Weakness or dizziness.  GO DIRECTLY TO THE NEAREST EMERGENCY ROOM IF YOU HAVE ANY OF THE FOLLOWING:      Difficulty breathing              Chills and/or fever over 101 F   Persistent vomiting and/or vomiting blood   Severe abdominal pain   Severe chest pain   Black, tarry stools   Bleeding- more than one tablespoon   Any other symptom or condition that you feel may need urgent attention  Your doctor recommends these additional instructions:  If any biopsies were taken, your doctors clinic will contact you in 1 to 2   weeks with any results.  - Patient has a contact number available for emergencies.  The signs and   symptoms of potential delayed complications were discussed with the   patient.  Return to normal activities tomorrow.  Written discharge   instructions were provided to the patient.   - Discharge patient to home.   - Resume previous diet.   - Continue present medications.   - Repeat colonoscopy in 10 years for screening purposes.   For questions, problems or results please call your physician - Evelio Golden MD at Work:  [phone].  [facility], EMERGENCY ROOM PHONE NUMBER: [phone]  IF A COMPLICATION OR EMERGENCY SITUATION ARISES AND YOU ARE UNABLE TO REACH   YOUR PHYSICIAN - GO DIRECTLY TO THE EMERGENCY ROOM.  Evelio Golden MD  6/24/2024 11:50:27 AM  This report has been verified and signed electronically.  Dear patient,  As a result of recent federal legislation (The Federal Cures Act), you may   receive lab or pathology results from your procedure in your [facility]   account before your physician is able to contact you. Your physician or   their representative will relay the results to you with their   recommendations at their soonest availability.  Thank you,  PROVATION

## 2024-06-24 NOTE — PLAN OF CARE
Patient speaks and understands some English   Asked the  to reexplain that she still needs to give a urine sample prior to her procedure.  and I educated pt to please press call light provided to pt when she feels the urge to use the restroom.   Procedure pending UPT test

## 2024-06-24 NOTE — PLAN OF CARE
explained preop poc and procedure process to patient with Dorothea . Patient verbalized understanding and questions answered

## 2024-06-27 ENCOUNTER — TELEPHONE (OUTPATIENT)
Dept: GASTROENTEROLOGY | Facility: CLINIC | Age: 44
End: 2024-06-27
Payer: COMMERCIAL

## 2024-06-27 ENCOUNTER — PATIENT MESSAGE (OUTPATIENT)
Dept: GASTROENTEROLOGY | Facility: CLINIC | Age: 44
End: 2024-06-27
Payer: COMMERCIAL

## 2024-06-27 LAB
FINAL PATHOLOGIC DIAGNOSIS: NORMAL
GROSS: NORMAL
Lab: NORMAL
SUPPLEMENTAL DIAGNOSIS: NORMAL

## 2024-06-27 NOTE — TELEPHONE ENCOUNTER
----- Message from Evelio Golden MD sent at 6/27/2024 12:36 PM CDT -----  Please notify patient, the stomach biopsies did not reveal any H.pylori.

## 2024-09-20 PROBLEM — E55.9 VITAMIN D DEFICIENCY: Status: RESOLVED | Noted: 2017-09-29 | Resolved: 2024-09-20

## 2024-10-25 ENCOUNTER — LAB VISIT (OUTPATIENT)
Dept: LAB | Facility: HOSPITAL | Age: 44
End: 2024-10-25
Attending: INTERNAL MEDICINE
Payer: COMMERCIAL

## 2024-10-25 ENCOUNTER — OFFICE VISIT (OUTPATIENT)
Dept: INTERNAL MEDICINE | Facility: CLINIC | Age: 44
End: 2024-10-25
Payer: COMMERCIAL

## 2024-10-25 VITALS
SYSTOLIC BLOOD PRESSURE: 99 MMHG | HEART RATE: 72 BPM | BODY MASS INDEX: 20.41 KG/M2 | OXYGEN SATURATION: 99 % | DIASTOLIC BLOOD PRESSURE: 60 MMHG | HEIGHT: 66 IN | WEIGHT: 127 LBS

## 2024-10-25 DIAGNOSIS — K58.8 OTHER IRRITABLE BOWEL SYNDROME: Primary | ICD-10-CM

## 2024-10-25 DIAGNOSIS — Z12.31 ENCOUNTER FOR SCREENING MAMMOGRAM FOR BREAST CANCER: ICD-10-CM

## 2024-10-25 DIAGNOSIS — K58.8 OTHER IRRITABLE BOWEL SYNDROME: ICD-10-CM

## 2024-10-25 PROBLEM — K29.50 CHRONIC GASTRITIS WITHOUT BLEEDING: Status: RESOLVED | Noted: 2023-07-05 | Resolved: 2024-10-25

## 2024-10-25 PROBLEM — K58.9 IRRITABLE BOWEL SYNDROME (IBS): Status: ACTIVE | Noted: 2024-10-25

## 2024-10-25 PROBLEM — Z86.19 HISTORY OF HELICOBACTER INFECTION: Status: RESOLVED | Noted: 2021-08-05 | Resolved: 2024-10-25

## 2024-10-25 PROCEDURE — 87086 URINE CULTURE/COLONY COUNT: CPT | Performed by: INTERNAL MEDICINE

## 2024-10-25 PROCEDURE — 81001 URINALYSIS AUTO W/SCOPE: CPT | Performed by: INTERNAL MEDICINE

## 2024-10-25 PROCEDURE — 99999 PR PBB SHADOW E&M-EST. PATIENT-LVL III: CPT | Mod: PBBFAC,,, | Performed by: INTERNAL MEDICINE

## 2024-10-25 NOTE — PROGRESS NOTES
INTERNAL MEDICINE CLINIC  Follow-up Visit Progress Note     PRESENTING HISTORY     PCP: Guzman Salcedo MD    Last Clinic Visit with me:  3-    Current Chief Complaint/Problem:    Chief Complaint   Patient presents with    Abdominal Pain       History of Present Illness & ROS: Ms. Compa Beard is a 44 y.o. female.    Chronic bloating of RUQ area.  Had upper and lower GI.  Had Ultrasound many times.    PAST HISTORY:     Past Medical History:   Diagnosis Date    Atopic neurodermatitis 07/10/2019    Chronic gastritis without bleeding 2023 EGD   - Normal Study. Stomach biopsied to evaluate for H.pylori negative.      COVID-19 06/15/2022    Family history of heart disease in male family member before age 55 2019    Fish allergy 2021    Helicobacter pylori infection 2021 H. Pylori Triple Therapy: 1. Take Pantoprazole 40 m tablet twice daily for 14 days. 2. Take Clarithromycin 500 m tablet twice daily for 14 days. 3. Take Metronidazole 500 m tablet three times daily for 14 days.  Do not drink alcohol during this treatment.     History of Helicobacter infection 2021  H. Pylori Triple Therapy:  1. Take Pantoprazole 40 m tablet twice daily for 14 days.  2. Take Clarithromycin 500 m tablet twice daily for 14 days.  3. Take Metronidazole 500 m tablet three times daily for 14 days.     Do not drink alcohol during this treatment.         Mild intermittent asthma without complication 2020    Photosensitivity of skin 07/10/2019    Vitamin D deficiency 2017       Past Surgical History:   Procedure Laterality Date    COLONOSCOPY N/A 2024    Procedure: COLONOSCOPY;  Surgeon: Evelio Golden MD;  Location: [facility] ENDOSCOPY;  Service: Endoscopy;  Laterality: N/A;    ESOPHAGOGASTRODUODENOSCOPY N/A 2024    Procedure: EGD (ESOPHAGOGASTRODUODENOSCOPY);  Surgeon: Evelio Golden MD;  Location: [facility] ENDOSCOPY;  Service:  Endoscopy;  Laterality: N/A;  Mandarin  needed (pt does understand some English)  Referral: Guzman Salcedo MD / PEG / Inst portal - LW  6/17/24- pc complete. DBM       Family History   Problem Relation Name Age of Onset    No Known Problems Mother      Heart disease Father  55        CAD s/p stent    No Known Problems Sister      No Known Problems Brother      Breast cancer Neg Hx      Colon cancer Neg Hx      Ovarian cancer Neg Hx      Diabetes Neg Hx      Hypertension Neg Hx         Social History     Socioeconomic History    Marital status:     Number of children: 1   Occupational History    Occupation: sales   Tobacco Use    Smoking status: Never    Smokeless tobacco: Never   Substance and Sexual Activity    Alcohol use: No    Drug use: No    Sexual activity: Yes     Partners: Male     Birth control/protection: None, Condom     Comment:  since 2003   Social History Narrative    She sales items on boats.    : 1 child.    Lived in [place] for 7 yrs now (as of 2020)       MEDICATIONS & ALLERGIES:     Current Outpatient Medications on File Prior to Visit   Medication Sig Dispense Refill    [DISCONTINUED] dicyclomine (BENTYL) 10 MG capsule Take 1 capsule (10 mg total) by mouth 4 (four) times daily as needed. 120 capsule 3    [DISCONTINUED] hyoscyamine (LEVSIN/SL) 0.125 mg Subl Place 1 tablet (0.125 mg total) under the tongue every 6 (six) hours as needed (abdominal spasm). 90 tablet 11    EPINEPHrine (EPIPEN) 0.3 mg/0.3 mL AtIn Inject 0.3 mLs (0.3 mg total) into the muscle once. for 1 dose 0.3 mL 0     No current facility-administered medications on file prior to visit.        Review of patient's allergies indicates:   Allergen Reactions    Fish containing products Swelling     Facial swelling (lips, eyes, all areas)       Medications Reconciliation:   I have reconciled the patient's home medications and discharge medications with the patient/family. I have updated all changes.   Refer to After-Visit Medication List.    OBJECTIVE:     Vital Signs:  Vitals:    10/25/24 1518   BP: 99/60   Pulse: 72     Wt Readings from Last 3 Encounters:   10/25/24 1518 57.6 kg (126 lb 15.8 oz)   24 0900 56.7 kg (125 lb)   24 0803 57.4 kg (126 lb 8.7 oz)     Body mass index is 20.5 kg/m².     Physical Exam:  General: Well developed, well nourished. No distress.  HEENT: Head is normocephalic, atraumatic;  Eyes: Clear conjunctiva.  Neck: Supple, symmetrical neck; trachea midline.  Lungs: normal respiratory effort.  Cardiovascular: Heart with regular rate and rhythm.    Extremities: No LE edema. Pulses 2+ and symmetric.   Abdomen: Abdomen is soft, non-tender non-distended   Musculoskeletal: Normal gait.   Psychiatric: Normal affect. Alert.\    Laboratory  Lab Results   Component Value Date    WBC 5.12 2024    HGB 13.3 2024    HCT 41.5 2024     2024    CHOL 203 (H) 2024    TRIG 72 2024    HDL 50 2024    ALT 10 2024    AST 16 2024     2024    K 4.3 2024     2024    CREATININE 0.7 2024    BUN 10 2024    CO2 26 2024    TSH 1.809 2024    HGBA1C 5.5 2024     ASSESSMENT & PLAN:     Other irritable bowel syndrome  - Normal EGD and C-scope .    Failed Levsin and Bentyl     Plan:  Trial of Kefir BID (buy at [employer])     Other Medical Problems Reviewed:     Mild intermittent chronic asthma   - Stable without any attacks in the past year.  -     albuterol (VENTOLIN HFA) 90 mcg/actuation inhaler; Inhale 2 puffs into the lungs every 6 (six) hours as needed for Wheezing.       Vitamin D deficiency  -    Was on replacement before. Not taking anything anymore.     History of H pylori infection  Chronic gastritis without bleeding  -     H. PYLORI ANTIBODY, IGG;8-4-21 Positive              2021              1. Take Pantoprazole 40 m tablet twice daily for 14 days.              2.  Take Clarithromycin 500 m tablet twice daily for 14 days.              3. Take Metronidazole 500 m tablet three times daily for 14 days     -     US Abdomen 2021: Normal  -     H. pylori antigen stool negative: 2021     EGD at [facility]:  3-      - Esophagus normal.  Mild gastritis.  Negative H pylori.     - EGD 24 normal. Negative H pylori.     Family history of heart disease in male family member before age 55  - No chest pain symptoms.     Atopic neurodermatitis  Photosensitivity of skin  - Off all meds.     Fish allergy  -     EPINEPHrine (EPIPEN) 0.3 mg/0.3 mL AtIn; Inject 0.3 mLs (0.3 mg total) into the muscle once. for 1 dose     Uterine leiomyoma, unspecified location  2024  Two uterine fibroids, similar to prior.       Preventive Health Maintenance:     Flu vaccine - refused.      Encounter for screening mammogram for breast cancer  -     Mammo Digital Screening Bilat; Future; Expected date: 10/25/2024    -     Urinalysis, Reflex to Urine Culture; Future; Expected date: 10/25/2024    Return to Clinic for Follow Up with me: Kory for annual    Scheduled Follow-up :  Future Appointments   Date Time Provider Department Center   3/6/2025 12:00 PM Guzman Salcedo MD [facility]         After Visit Medication List :     Medication List            Accurate as of 2024  3:52 PM. If you have any questions, ask your nurse or doctor.                CONTINUE taking these medications      EPINEPHrine 0.3 mg/0.3 mL Atin  Commonly known as: EPIPEN  Inject 0.3 mLs (0.3 mg total) into the muscle once. for 1 dose            STOP taking these medications      dicyclomine 10 MG capsule  Commonly known as: BENTYL  Stopped by: Guzman Salcedo MD     hyoscyamine 0.125 mg Subl  Commonly known as: LEVSIN/SL  Stopped by: Guzman Salcedo MD              Signing Physician:  Guzman Salcedo MD

## 2024-10-26 LAB
BACTERIA #/AREA URNS AUTO: ABNORMAL /HPF
BILIRUB UR QL STRIP: NEGATIVE
CAOX CRY UR QL COMP ASSIST: ABNORMAL
CLARITY UR REFRACT.AUTO: CLEAR
COLOR UR AUTO: YELLOW
GLUCOSE UR QL STRIP: NEGATIVE
HGB UR QL STRIP: NEGATIVE
KETONES UR QL STRIP: NEGATIVE
LEUKOCYTE ESTERASE UR QL STRIP: ABNORMAL
MICROSCOPIC COMMENT: ABNORMAL
NITRITE UR QL STRIP: NEGATIVE
PH UR STRIP: 5 [PH] (ref 5–8)
PROT UR QL STRIP: NEGATIVE
RBC #/AREA URNS AUTO: 3 /HPF (ref 0–4)
SP GR UR STRIP: 1.02 (ref 1–1.03)
SQUAMOUS #/AREA URNS AUTO: 19 /HPF
URN SPEC COLLECT METH UR: ABNORMAL
WBC #/AREA URNS AUTO: 14 /HPF (ref 0–5)

## 2024-10-27 LAB — BACTERIA UR CULT: NO GROWTH

## 2024-10-28 ENCOUNTER — PATIENT MESSAGE (OUTPATIENT)
Dept: INTERNAL MEDICINE | Facility: CLINIC | Age: 44
End: 2024-10-28
Payer: COMMERCIAL

## 2024-10-28 ENCOUNTER — LAB VISIT (OUTPATIENT)
Dept: LAB | Facility: HOSPITAL | Age: 44
End: 2024-10-28
Attending: INTERNAL MEDICINE
Payer: COMMERCIAL

## 2024-10-28 ENCOUNTER — TELEPHONE (OUTPATIENT)
Dept: INTERNAL MEDICINE | Facility: CLINIC | Age: 44
End: 2024-10-28
Payer: COMMERCIAL

## 2024-10-28 DIAGNOSIS — R82.90 ABNORMAL URINE FINDING: Primary | ICD-10-CM

## 2024-10-28 DIAGNOSIS — R82.90 ABNORMAL URINE FINDING: ICD-10-CM

## 2024-10-28 LAB
BACTERIA #/AREA URNS AUTO: NORMAL /HPF
BILIRUB UR QL STRIP: NEGATIVE
CLARITY UR REFRACT.AUTO: CLEAR
COLOR UR AUTO: YELLOW
GLUCOSE UR QL STRIP: NEGATIVE
HGB UR QL STRIP: NEGATIVE
KETONES UR QL STRIP: NEGATIVE
LEUKOCYTE ESTERASE UR QL STRIP: ABNORMAL
MICROSCOPIC COMMENT: NORMAL
NITRITE UR QL STRIP: NEGATIVE
PH UR STRIP: 7 [PH] (ref 5–8)
PROT UR QL STRIP: NEGATIVE
RBC #/AREA URNS AUTO: 1 /HPF (ref 0–4)
SP GR UR STRIP: 1.01 (ref 1–1.03)
SQUAMOUS #/AREA URNS AUTO: 3 /HPF
URN SPEC COLLECT METH UR: ABNORMAL
WBC #/AREA URNS AUTO: 3 /HPF (ref 0–5)

## 2024-10-28 PROCEDURE — 81001 URINALYSIS AUTO W/SCOPE: CPT | Performed by: INTERNAL MEDICINE

## 2025-03-06 ENCOUNTER — OFFICE VISIT (OUTPATIENT)
Dept: INTERNAL MEDICINE | Facility: CLINIC | Age: 45
End: 2025-03-06
Payer: COMMERCIAL

## 2025-03-06 VITALS
HEIGHT: 66 IN | SYSTOLIC BLOOD PRESSURE: 98 MMHG | BODY MASS INDEX: 20.55 KG/M2 | OXYGEN SATURATION: 98 % | DIASTOLIC BLOOD PRESSURE: 64 MMHG | WEIGHT: 127.88 LBS | HEART RATE: 72 BPM

## 2025-03-06 DIAGNOSIS — Z00.00 ANNUAL PHYSICAL EXAM: Primary | ICD-10-CM

## 2025-03-06 DIAGNOSIS — L56.8 PHOTOSENSITIVITY OF SKIN: ICD-10-CM

## 2025-03-06 DIAGNOSIS — L20.81 ATOPIC NEURODERMATITIS: ICD-10-CM

## 2025-03-06 DIAGNOSIS — Z82.49 FAMILY HISTORY OF HEART DISEASE IN MALE FAMILY MEMBER BEFORE AGE 55: ICD-10-CM

## 2025-03-06 DIAGNOSIS — J45.20 MILD INTERMITTENT ASTHMA WITHOUT COMPLICATION: ICD-10-CM

## 2025-03-06 DIAGNOSIS — R10.31 RIGHT LOWER QUADRANT PAIN: ICD-10-CM

## 2025-03-06 DIAGNOSIS — D25.9 UTERINE LEIOMYOMA, UNSPECIFIED LOCATION: ICD-10-CM

## 2025-03-06 DIAGNOSIS — Z91.013 FISH ALLERGY: ICD-10-CM

## 2025-03-06 DIAGNOSIS — K58.8 OTHER IRRITABLE BOWEL SYNDROME: ICD-10-CM

## 2025-03-06 PROCEDURE — 99999 PR PBB SHADOW E&M-EST. PATIENT-LVL III: CPT | Mod: PBBFAC,,, | Performed by: INTERNAL MEDICINE

## 2025-03-06 NOTE — PROGRESS NOTES
INTERNAL MEDICINE CLINIC  Follow-up Visit Progress Note     PRESENTING HISTORY     PCP: Guzman Salcedo MD    Last Clinic Visit with me: 10-    Current Chief Complaint/Problem:    Chief Complaint   Patient presents with    Annual Exam      History of Present Illness & ROS: Ms. Compa Beard is a 44 y.o. female.    Still with RUQ and RLQ pain occasionally.    No dysuria. Has chronic abdominal distension.    No weight loss. No chest pain or SOB.      PAST HISTORY:     Past Medical History:   Diagnosis Date    Atopic neurodermatitis 07/10/2019    Chronic gastritis without bleeding 2023 EGD   - Normal Study. Stomach biopsied to evaluate for H.pylori negative.      COVID-19 06/15/2022    Family history of heart disease in male family member before age 55 2019    Fish allergy 2021    Helicobacter pylori infection 2021 H. Pylori Triple Therapy: 1. Take Pantoprazole 40 m tablet twice daily for 14 days. 2. Take Clarithromycin 500 m tablet twice daily for 14 days. 3. Take Metronidazole 500 m tablet three times daily for 14 days.  Do not drink alcohol during this treatment.     History of Helicobacter infection 2021  H. Pylori Triple Therapy:  1. Take Pantoprazole 40 m tablet twice daily for 14 days.  2. Take Clarithromycin 500 m tablet twice daily for 14 days.  3. Take Metronidazole 500 m tablet three times daily for 14 days.     Do not drink alcohol during this treatment.         Mild intermittent asthma without complication 2020    Photosensitivity of skin 07/10/2019    Vitamin D deficiency 2017       Past Surgical History:   Procedure Laterality Date    COLONOSCOPY N/A 2024    Procedure: COLONOSCOPY;  Surgeon: Evelio Golden MD;  Location: [facility] ENDOSCOPY;  Service: Endoscopy;  Laterality: N/A;    ESOPHAGOGASTRODUODENOSCOPY N/A 2024    Procedure: EGD (ESOPHAGOGASTRODUODENOSCOPY);  Surgeon: Evelio Golden  MD MICHAEL;  Location: [facility] ENDOSCOPY;  Service: Endoscopy;  Laterality: N/A;  Mandarin  needed (pt does understand some English)  Referral: Guzman Salcedo MD / PEG / Inst portal - LW  6/17/24- pc complete. DBM       Family History   Problem Relation Name Age of Onset    No Known Problems Mother      Heart disease Father  55        CAD s/p stent    No Known Problems Sister      No Known Problems Brother      Breast cancer Neg Hx      Colon cancer Neg Hx      Ovarian cancer Neg Hx      Diabetes Neg Hx      Hypertension Neg Hx         Social History     Socioeconomic History    Marital status:     Number of children: 1   Occupational History    Occupation: sales   Tobacco Use    Smoking status: Never    Smokeless tobacco: Never   Substance and Sexual Activity    Alcohol use: No    Drug use: No    Sexual activity: Yes     Partners: Male     Birth control/protection: None, Condom     Comment:  since 2003   Social History Narrative    She sales items on boats.    : 1 child.    Lived in [place] for 7 yrs now (as of 2020)       MEDICATIONS & ALLERGIES:     Medications Ordered Prior to Encounter[1]     Review of patient's allergies indicates:   Allergen Reactions    Fish containing products Swelling     Facial swelling (lips, eyes, all areas)       Medications Reconciliation:   I have reconciled the patient's home medications and discharge medications with the patient/family. I have updated all changes.  Refer to After-Visit Medication List.    OBJECTIVE:     Vital Signs:  Vitals:    03/06/25 1203   BP: 98/64   Pulse: 72     Wt Readings from Last 3 Encounters:   03/06/25 1203 58 kg (127 lb 13.9 oz)   10/25/24 1518 57.6 kg (126 lb 15.8 oz)   06/24/24 0900 56.7 kg (125 lb)     Body mass index is 20.64 kg/m².     Physical Exam:  General: Well developed, well nourished. No distress.  HEENT: Head is normocephalic, atraumatic; ears are normal.    Eyes: Clear conjunctiva.  Neck: Supple, symmetrical  neck; trachea midline.  Lungs: Clear to auscultation bilaterally and normal respiratory effort.  Cardiovascular: Heart with regular rate and rhythm.    Extremities: No LE edema. Pulses 2+ and symmetric.   Abdomen: Abdomen is soft, non-tender non-distended with normal bowel sounds.  Skin: Skin color, texture, turgor normal. No rashes.  Musculoskeletal: Normal gait.   Lymph Nodes: No cervical or supraclavicular adenopathy.  Neurologic: Normal strength and tone. No focal numbness or weakness.   Psychiatric: Normal affect. Alert.    Laboratory  Lab Results   Component Value Date    WBC 5.12 01/02/2024    HGB 13.3 01/02/2024    HCT 41.5 01/02/2024     01/02/2024    CHOL 203 (H) 01/02/2024    TRIG 72 01/02/2024    HDL 50 01/02/2024    ALT 10 01/02/2024    AST 16 01/02/2024     01/02/2024    K 4.3 01/02/2024     01/02/2024    CREATININE 0.7 01/02/2024    BUN 10 01/02/2024    CO2 26 01/02/2024    TSH 1.809 01/02/2024    HGBA1C 5.5 01/02/2024       ASSESSMENT & PLAN:     Annual physical exam  - Reviewed and updated past and current medical problems.  Discussed treatment of current medical problems    -     Lipid Panel; Future; Expected date: 03/06/2025  -     CBC Auto Differential; Future; Expected date: 03/06/2025  -     Comprehensive Metabolic Panel; Future; Expected date: 03/06/2025  -     TSH; Future; Expected date: 03/06/2025  -     Hemoglobin A1C; Future; Expected date: 03/06/2025  -     Hepatitis B Surface Ab, Qualitative; Future; Expected date: 03/06/2025    Mild intermittent chronic asthma   - Stable without any attacks in the past year.    Not using albuterol.      Family history of heart disease in male family member before age 55  - No chest pain symptoms.     Atopic neurodermatitis  Photosensitivity of skin  - Off all meds.     Fish allergy  -     EPINEPHrine (EPIPEN) 0.3 mg/0.3 mL AtIn; Inject 0.3 mLs (0.3 mg total) into the muscle once.     Other irritable bowel syndrome  - Normal EGD and  C-scope 6-2024.    Failed Levsin and Bentyl    Abdominal is normal now with diet control.    Uterine leiomyoma, unspecified location  2-  Two uterine fibroids, similar to prior.       Right lower quadrant pain  - Rule out kidney stone and GB issues. Non specific RUQ and RLQ symptoms.  -     Urinalysis, Reflex to Urine Culture Urine, Clean Catch; Future; Expected date: 03/06/2025  -     CT Abdomen Pelvis  Without Contrast; Future; Expected date: 03/06/2025    Preventive Health Maintenance:     Up to date    Return to Clinic for Follow Up with me: 1Year    Scheduled Follow-up :  Future Appointments   Date Time Provider Department Center   5/22/2025 12:00 PM [facility] [place]       After Visit Medication List :     Medication List            Accurate as of March 6, 2025 12:19 PM. If you have any questions, ask your nurse or doctor.                CONTINUE taking these medications      EPINEPHrine 0.3 mg/0.3 mL Atin  Commonly known as: EPIPEN  Inject 0.3 mLs (0.3 mg total) into the muscle once. for 1 dose              Signing Physician:  Guzman Salcedo MD         [1]   Current Outpatient Medications on File Prior to Visit   Medication Sig Dispense Refill    EPINEPHrine (EPIPEN) 0.3 mg/0.3 mL AtIn Inject 0.3 mLs (0.3 mg total) into the muscle once. for 1 dose 0.3 mL 0     No current facility-administered medications on file prior to visit.

## 2025-03-10 ENCOUNTER — PATIENT MESSAGE (OUTPATIENT)
Dept: INTERNAL MEDICINE | Facility: CLINIC | Age: 45
End: 2025-03-10
Payer: COMMERCIAL

## 2025-03-10 ENCOUNTER — RESULTS FOLLOW-UP (OUTPATIENT)
Dept: INTERNAL MEDICINE | Facility: CLINIC | Age: 45
End: 2025-03-10

## 2025-03-10 ENCOUNTER — LAB VISIT (OUTPATIENT)
Dept: LAB | Facility: HOSPITAL | Age: 45
End: 2025-03-10
Attending: INTERNAL MEDICINE
Payer: COMMERCIAL

## 2025-03-10 DIAGNOSIS — Z00.00 ANNUAL PHYSICAL EXAM: ICD-10-CM

## 2025-03-10 DIAGNOSIS — N30.01 ACUTE CYSTITIS WITH HEMATURIA: Primary | ICD-10-CM

## 2025-03-10 DIAGNOSIS — Z82.49 FAMILY HISTORY OF HEART DISEASE IN MALE FAMILY MEMBER BEFORE AGE 55: ICD-10-CM

## 2025-03-10 DIAGNOSIS — L20.81 ATOPIC NEURODERMATITIS: ICD-10-CM

## 2025-03-10 LAB
ALBUMIN SERPL BCP-MCNC: 3.8 G/DL (ref 3.5–5.2)
ALP SERPL-CCNC: 53 U/L (ref 40–150)
ALT SERPL W/O P-5'-P-CCNC: 12 U/L (ref 10–44)
ANION GAP SERPL CALC-SCNC: 7 MMOL/L (ref 8–16)
AST SERPL-CCNC: 21 U/L (ref 10–40)
BASOPHILS # BLD AUTO: 0.03 K/UL (ref 0–0.2)
BASOPHILS NFR BLD: 0.6 % (ref 0–1.9)
BILIRUB SERPL-MCNC: 0.4 MG/DL (ref 0.1–1)
BUN SERPL-MCNC: 9 MG/DL (ref 6–20)
CALCIUM SERPL-MCNC: 9.1 MG/DL (ref 8.7–10.5)
CHLORIDE SERPL-SCNC: 104 MMOL/L (ref 95–110)
CHOLEST SERPL-MCNC: 184 MG/DL (ref 120–199)
CHOLEST/HDLC SERPL: 3.1 {RATIO} (ref 2–5)
CO2 SERPL-SCNC: 26 MMOL/L (ref 23–29)
CREAT SERPL-MCNC: 0.6 MG/DL (ref 0.5–1.4)
DIFFERENTIAL METHOD BLD: ABNORMAL
EOSINOPHIL # BLD AUTO: 0.2 K/UL (ref 0–0.5)
EOSINOPHIL NFR BLD: 2.8 % (ref 0–8)
ERYTHROCYTE [DISTWIDTH] IN BLOOD BY AUTOMATED COUNT: 13.2 % (ref 11.5–14.5)
EST. GFR  (NO RACE VARIABLE): >60 ML/MIN/1.73 M^2
ESTIMATED AVG GLUCOSE: 103 MG/DL (ref 68–131)
GLUCOSE SERPL-MCNC: 91 MG/DL (ref 70–110)
HBA1C MFR BLD: 5.2 % (ref 4–5.6)
HBV SURFACE AB SER-ACNC: >1000 MIU/ML
HBV SURFACE AB SER-ACNC: REACTIVE M[IU]/ML
HCT VFR BLD AUTO: 40.8 % (ref 37–48.5)
HDLC SERPL-MCNC: 59 MG/DL (ref 40–75)
HDLC SERPL: 32.1 % (ref 20–50)
HGB BLD-MCNC: 13 G/DL (ref 12–16)
IMM GRANULOCYTES # BLD AUTO: 0.01 K/UL (ref 0–0.04)
IMM GRANULOCYTES NFR BLD AUTO: 0.2 % (ref 0–0.5)
LDLC SERPL CALC-MCNC: 104.2 MG/DL (ref 63–159)
LYMPHOCYTES # BLD AUTO: 2.2 K/UL (ref 1–4.8)
LYMPHOCYTES NFR BLD: 41.1 % (ref 18–48)
MCH RBC QN AUTO: 30.2 PG (ref 27–31)
MCHC RBC AUTO-ENTMCNC: 31.9 G/DL (ref 32–36)
MCV RBC AUTO: 95 FL (ref 82–98)
MONOCYTES # BLD AUTO: 0.4 K/UL (ref 0.3–1)
MONOCYTES NFR BLD: 7.4 % (ref 4–15)
NEUTROPHILS # BLD AUTO: 2.5 K/UL (ref 1.8–7.7)
NEUTROPHILS NFR BLD: 47.9 % (ref 38–73)
NONHDLC SERPL-MCNC: 125 MG/DL
NRBC BLD-RTO: 0 /100 WBC
PLATELET # BLD AUTO: 244 K/UL (ref 150–450)
PMV BLD AUTO: 12.5 FL (ref 9.2–12.9)
POTASSIUM SERPL-SCNC: 4.2 MMOL/L (ref 3.5–5.1)
PROT SERPL-MCNC: 7.5 G/DL (ref 6–8.4)
RBC # BLD AUTO: 4.3 M/UL (ref 4–5.4)
SODIUM SERPL-SCNC: 137 MMOL/L (ref 136–145)
TRIGL SERPL-MCNC: 104 MG/DL (ref 30–150)
TSH SERPL DL<=0.005 MIU/L-ACNC: 2.03 UIU/ML (ref 0.4–4)
WBC # BLD AUTO: 5.28 K/UL (ref 3.9–12.7)

## 2025-03-10 PROCEDURE — 86706 HEP B SURFACE ANTIBODY: CPT | Performed by: INTERNAL MEDICINE

## 2025-03-10 PROCEDURE — 80053 COMPREHEN METABOLIC PANEL: CPT | Performed by: INTERNAL MEDICINE

## 2025-03-10 PROCEDURE — 80061 LIPID PANEL: CPT | Performed by: INTERNAL MEDICINE

## 2025-03-10 PROCEDURE — 84443 ASSAY THYROID STIM HORMONE: CPT | Performed by: INTERNAL MEDICINE

## 2025-03-10 PROCEDURE — 83036 HEMOGLOBIN GLYCOSYLATED A1C: CPT | Performed by: INTERNAL MEDICINE

## 2025-03-10 PROCEDURE — 85025 COMPLETE CBC W/AUTO DIFF WBC: CPT | Performed by: INTERNAL MEDICINE

## 2025-03-10 RX ORDER — CEPHALEXIN 500 MG/1
500 CAPSULE ORAL EVERY 8 HOURS
Qty: 21 CAPSULE | Refills: 0 | Status: SHIPPED | OUTPATIENT
Start: 2025-03-10 | End: 2025-03-17

## 2025-03-10 NOTE — TELEPHONE ENCOUNTER
cephALEXin (KEFLEX) 500 MG capsule 21 capsule 0 3/10/2025 3/17/2025 No   Sig - Route: Take 1 capsule (500 mg total) by mouth every 8 (eight) hours. for 7 days - Oral   Sent to pharmacy as: cephALEXin (KEFLEX) 500 MG capsule   Class: Normal   Order: [id number]   Date/Time Signed: 3/10/2025 12:43       E-Prescribing Status: Transmission to pharmacy in progress (3/10/2025 12:43 PM CDT)     Pharmacy    [facility] [address] [place] [address]    Associated Diagnoses    Acute cystitis with hematuria  - Primary

## 2025-03-12 ENCOUNTER — HOSPITAL ENCOUNTER (OUTPATIENT)
Dept: RADIOLOGY | Facility: HOSPITAL | Age: 45
Discharge: HOME | End: 2025-03-12
Attending: INTERNAL MEDICINE
Payer: COMMERCIAL

## 2025-03-12 ENCOUNTER — RESULTS FOLLOW-UP (OUTPATIENT)
Dept: INTERNAL MEDICINE | Facility: CLINIC | Age: 45
End: 2025-03-12

## 2025-03-12 DIAGNOSIS — R10.31 RIGHT LOWER QUADRANT PAIN: ICD-10-CM

## 2025-03-12 PROCEDURE — 74176 CT ABD & PELVIS W/O CONTRAST: CPT | Mod: 26,,, | Performed by: RADIOLOGY

## 2025-03-12 PROCEDURE — 25500020 PHARM REV CODE 255: Performed by: INTERNAL MEDICINE

## 2025-03-12 PROCEDURE — 74176 CT ABD & PELVIS W/O CONTRAST: CPT | Mod: TC

## 2025-03-12 RX ADMIN — IOHEXOL 30 ML: 350 INJECTION, SOLUTION INTRAVENOUS at 08:03

## 2025-03-28 ENCOUNTER — OFFICE VISIT (OUTPATIENT)
Dept: INTERNAL MEDICINE | Facility: CLINIC | Age: 45
End: 2025-03-28
Payer: COMMERCIAL

## 2025-03-28 VITALS
SYSTOLIC BLOOD PRESSURE: 92 MMHG | OXYGEN SATURATION: 99 % | HEART RATE: 74 BPM | DIASTOLIC BLOOD PRESSURE: 62 MMHG | BODY MASS INDEX: 20.9 KG/M2 | WEIGHT: 130.06 LBS | HEIGHT: 66 IN

## 2025-03-28 DIAGNOSIS — Z87.440 HISTORY OF UTI: ICD-10-CM

## 2025-03-28 DIAGNOSIS — R22.1 NECK MASS: Primary | ICD-10-CM

## 2025-03-28 PROCEDURE — 81003 URINALYSIS AUTO W/O SCOPE: CPT | Performed by: INTERNAL MEDICINE

## 2025-03-28 PROCEDURE — 99999 PR PBB SHADOW E&M-EST. PATIENT-LVL III: CPT | Mod: PBBFAC,,, | Performed by: INTERNAL MEDICINE

## 2025-03-28 NOTE — PROGRESS NOTES
INTERNAL MED[facility]INE CLINIC  Follow-up Visit Progress Note     PRESENTING HISTORY     PCP: Guzman Salcedo MD    Last Clinic Visit with me:  3-6-2025 Annual    Current Chief Complaint/Problem:    Chief Complaint   Patient presents with    bulge behind ear      History of Present Illness & ROS: Ms. Compa Beard is a 44 y.o. female.    Bulge behind left ear for few days.  It is better today.  Mild itching. No pain.  No fever.    PAST HISTORY:     Past Medical History:   Diagnosis Date    Atopic neurodermatitis 07/10/2019    Chronic gastritis without bleeding 2023 EGD   - Normal Study. Stomach biopsied to evaluate for H.pylori negative.      COVID-19 06/15/2022    Family history of heart disease in male family member before age 55 2019    Fish allergy 2021    Helicobacter pylori infection 2021 H. Pylori Triple Therapy: 1. Take Pantoprazole 40 m tablet twice daily for 14 days. 2. Take Clarithromycin 500 m tablet twice daily for 14 days. 3. Take Metronidazole 500 m tablet three times daily for 14 days.  Do not drink alcohol during this treatment.     History of Helicobacter infection 2021  H. Pylori Triple Therapy:  1. Take Pantoprazole 40 m tablet twice daily for 14 days.  2. Take Clarithromycin 500 m tablet twice daily for 14 days.  3. Take Metronidazole 500 m tablet three times daily for 14 days.     Do not drink alcohol during this treatment.         Mild intermittent asthma without complication 2020    Photosensitivity of skin 07/10/2019    Vitamin D deficiency 2017       Past Surgical History:   Procedure Laterality Date    COLONOSCOPY N/A 2024    Procedure: COLONOSCOPY;  Surgeon: Evelio Golden MD;  Location: [facility] ENDOSCOPY;  Service: Endoscopy;  Laterality: N/A;    ESOPHAGOGASTRODUODENOSCOPY N/A 2024    Procedure: EGD (ESOPHAGOGASTRODUODENOSCOPY);  Surgeon: Evelio Golden MD;  Location: [facility]  ENDOSCOPY;  Service: Endoscopy;  Laterality: N/A;  Mandarin  needed (pt does understand some English)  Referral: Guzman Salcedo MD / PEG / [facility] - LW  6/17/24- pc complete. DBM       Family History   Problem Relation Name Age of Onset    No Known Problems Mother      Heart disease Father  55        CAD s/p stent    No Known Problems Sister      No Known Problems Brother      Breast cancer Neg Hx      Colon cancer Neg Hx      Ovarian cancer Neg Hx      Diabetes Neg Hx      Hypertension Neg Hx         Social History     Socioeconomic History    Marital status:     Number of children: 1   Occupational History    Occupation: sales   Tobacco Use    Smoking status: Never    Smokeless tobacco: Never   Substance and Sexual Activity    Alcohol use: No    Drug use: No    Sexual activity: Yes     Partners: Male     Birth control/protection: None, Condom     Comment:  since 2003   Social History Narrative    She sales items on boats.    : 1 child.    Lived in [place] for 7 yrs now (as of 2020)       MEDICATIONS & ALLERGIES:     Medications Ordered Prior to Encounter[1]     Review of patient's allergies indicates:   Allergen Reactions    Fish containing products Swelling     Facial swelling (lips, eyes, all areas)       Medications Reconciliation:   I have reconciled the patient's home medications and discharge medications with the patient/family. I have updated all changes.  Refer to After-Visit Medication List.    OBJECTIVE:     Vital Signs:  Vitals:    03/28/25 1417   BP: 92/62   Pulse: 74     Wt Readings from Last 3 Encounters:   03/28/25 1417 59 kg (130 lb 1.1 oz)   03/06/25 1203 58 kg (127 lb 13.9 oz)   10/25/24 1518 57.6 kg (126 lb 15.8 oz)     Body mass index is 20.99 kg/m².     Physical Exam:  General: Well developed, well nourished. No distress.  HEENT: Head is normocephalic, atraumatic; ears are normal.    Eyes: Clear conjunctiva.  Neck: Supple, symmetrical neck; trachea  midline.  Lungs: Clear to auscultation bilaterally and normal respiratory effort.  Cardiovascular: Heart with regular rate and rhythm.    Extremities: No LE edema.   Skin: Skin color, texture, turgor normal. No rashes.      Small bump to left neck area below ear.  Much smaller compared to the picture on her phone.    Laboratory  Lab Results   Component Value Date    WBC 5.28 03/10/2025    HGB 13.0 03/10/2025    HCT 40.8 03/10/2025     03/10/2025    CHOL 184 03/10/2025    TRIG 104 03/10/2025    HDL 59 03/10/2025    ALT 12 03/10/2025    AST 21 03/10/2025     03/10/2025    K 4.2 03/10/2025     03/10/2025    CREATININE 0.6 03/10/2025    BUN 9 03/10/2025    CO2 26 03/10/2025    TSH 2.027 03/10/2025    HGBA1C 5.2 03/10/2025       ASSESSMENT & PLAN:     Neck mass  - Resolving. Likely reactive lymph node to certain food or allergy.    No concerns.    History of UTI  - Recent UTI s/p treatment.      Plan to check for resolution:  -     Urinalysis, Reflex to Urine Culture; Future; Expected date: 03/28/2025    Other Medical Problems Reviewed:    Mild intermittent chronic asthma   - Stable without any attacks in the past year.    Not using albuterol.      Family history of heart disease in male family member before age 55  - No chest pain symptoms.     Atopic neurodermatitis  Photosensitivity of skin  - Off all meds.     Fish allergy  -     EPINEPHrine (EPIPEN) 0.3 mg/0.3 mL AtIn; Inject 0.3 mLs (0.3 mg total) into the muscle once.     Other irritable bowel syndrome  - Normal EGD and C-scope 6-2024.    Failed Levsin and Bentyl    Abdominal is normal now with diet control.     Uterine leiomyoma, unspecified location  2-  Two uterine fibroids, similar to prior.       Right lower quadrant pain  CT abd pelvis 3-  1. No acute abdominopelvic abnormality.  2. Fibroid uterus.    Preventive Health Maintenance:     Up to date     Return to Clinic for Follow Up with me: 1 Year    Scheduled Follow-up  :  Future Appointments   Date Time Provider Department Center   5/22/2025 12:00 PM [facility] [place]       After Visit Medication List :     Medication List            Accurate as of March 28, 2025  2:33 PM. If you have any questions, ask your nurse or doctor.                CONTINUE taking these medications      EPINEPHrine 0.3 mg/0.3 mL Atin  Commonly known as: EPIPEN  Inject 0.3 mLs (0.3 mg total) into the muscle once. for 1 dose              Signing Physician:  Guzman Salcedo MD         [1]   Current Outpatient Medications on File Prior to Visit   Medication Sig Dispense Refill    EPINEPHrine (EPIPEN) 0.3 mg/0.3 mL AtIn Inject 0.3 mLs (0.3 mg total) into the muscle once. for 1 dose 0.3 mL 0     No current facility-administered medications on file prior to visit.

## 2025-03-29 ENCOUNTER — RESULTS FOLLOW-UP (OUTPATIENT)
Dept: INTERNAL MEDICINE | Facility: CLINIC | Age: 45
End: 2025-03-29

## 2025-03-29 LAB
BILIRUB UR QL STRIP.AUTO: NEGATIVE
CLARITY UR: CLEAR
COLOR UR AUTO: COLORLESS
GLUCOSE UR QL STRIP: NEGATIVE
HGB UR QL STRIP: NEGATIVE
KETONES UR QL STRIP: NEGATIVE
LEUKOCYTE ESTERASE UR QL STRIP: NEGATIVE
NITRITE UR QL STRIP: NEGATIVE
PH UR STRIP: 6 [PH]
PROT UR QL STRIP: NEGATIVE
SP GR UR STRIP: 1.01
UROBILINOGEN UR STRIP-ACNC: NEGATIVE EU/DL

## 2025-04-08 ENCOUNTER — PATIENT MESSAGE (OUTPATIENT)
Dept: RADIOLOGY | Facility: HOSPITAL | Age: 45
End: 2025-04-08
Payer: COMMERCIAL

## 2025-06-26 ENCOUNTER — HOSPITAL ENCOUNTER (OUTPATIENT)
Dept: RADIOLOGY | Facility: HOSPITAL | Age: 45
Discharge: HOME | End: 2025-06-26
Attending: INTERNAL MEDICINE
Payer: COMMERCIAL

## 2025-06-26 VITALS — BODY MASS INDEX: 20.89 KG/M2 | WEIGHT: 130 LBS | HEIGHT: 66 IN

## 2025-06-26 DIAGNOSIS — Z12.31 ENCOUNTER FOR SCREENING MAMMOGRAM FOR BREAST CANCER: ICD-10-CM

## 2025-06-26 PROCEDURE — 77063 BREAST TOMOSYNTHESIS BI: CPT | Mod: TC

## 2025-07-03 ENCOUNTER — RESULTS FOLLOW-UP (OUTPATIENT)
Dept: INTERNAL MEDICINE | Facility: CLINIC | Age: 45
End: 2025-07-03